# Patient Record
Sex: FEMALE | Race: WHITE
[De-identification: names, ages, dates, MRNs, and addresses within clinical notes are randomized per-mention and may not be internally consistent; named-entity substitution may affect disease eponyms.]

---
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
## 2019-04-02 ENCOUNTER — ALLIED HEALTH/NURSE VISIT (OUTPATIENT)
Dept: FAMILY MEDICINE | Facility: CLINIC | Age: 19
End: 2019-04-02
Payer: COMMERCIAL

## 2019-04-02 VITALS
DIASTOLIC BLOOD PRESSURE: 70 MMHG | WEIGHT: 205.3 LBS | HEART RATE: 80 BPM | HEIGHT: 68 IN | TEMPERATURE: 97.7 F | BODY MASS INDEX: 31.11 KG/M2 | SYSTOLIC BLOOD PRESSURE: 116 MMHG | OXYGEN SATURATION: 98 % | RESPIRATION RATE: 16 BRPM

## 2019-04-02 DIAGNOSIS — N91.2 ABSENCE OF MENSTRUATION: Primary | ICD-10-CM

## 2019-04-02 LAB — HCG UR QL: POSITIVE

## 2019-04-02 PROCEDURE — 81025 URINE PREGNANCY TEST: CPT | Performed by: FAMILY MEDICINE

## 2019-04-02 PROCEDURE — 99207 ZZC NO CHARGE NURSE ONLY: CPT

## 2019-04-02 RX ORDER — PRENATAL VIT/IRON FUM/FOLIC AC 27MG-0.8MG
1 TABLET ORAL DAILY
Qty: 120 TABLET | Refills: 3 | COMMUNITY
Start: 2019-04-02 | End: 2022-03-10

## 2019-04-02 ASSESSMENT — MIFFLIN-ST. JEOR: SCORE: 1754.73

## 2019-04-02 NOTE — NURSING NOTE
Tess Graf is a 18 year old here today for a pregnancy test.  LMP: Patient's last menstrual period was 2018 (exact date).  Wt: 205 lbs 4.8 oz.    Symptoms include weight gain, breast tenderness, absence of menses, nausea &/or vomiting and fatigue.    Tess informed of positive pregnancy test results. JERED: 18    Educational advice given: nutrition, smoking and drugs & alcohol.    Current medications reviewed: Yes    Previous pregnancy history remarkable for: This is her first pregnancy. Transferring care from [place] as just moved to [place]. .    Plan: schedule appointment with OB Educator and/or OB class, follow-up appointment with Dr. Giles for pre- care, take multivitamin or pre-lavell vitamins, OB Education packet given and will sign release of information to obtain records from [place]..    She is to call back if she has any questions or concerns.  She is advised to notify a provider immediately if she experiences any severe cramping or abdominal pain or any vaginal bleeding.  MARINE Medrano

## 2019-04-08 ENCOUNTER — PRENATAL OFFICE VISIT (OUTPATIENT)
Dept: FAMILY MEDICINE | Facility: CLINIC | Age: 19
End: 2019-04-08
Payer: COMMERCIAL

## 2019-04-08 VITALS — BODY MASS INDEX: 47.44 KG/M2 | WEIGHT: 205 LBS | HEIGHT: 55 IN

## 2019-04-08 DIAGNOSIS — Z34.00 SUPERVISION OF NORMAL FIRST PREGNANCY: Primary | ICD-10-CM

## 2019-04-08 PROCEDURE — 99207 ZZC NO CHARGE NURSE ONLY: CPT

## 2019-04-08 ASSESSMENT — MIFFLIN-ST. JEOR: SCORE: 1181.87

## 2019-04-10 ENCOUNTER — ANCILLARY PROCEDURE (OUTPATIENT)
Dept: ULTRASOUND IMAGING | Facility: OTHER | Age: 19
End: 2019-04-10
Attending: FAMILY MEDICINE
Payer: COMMERCIAL

## 2019-04-10 DIAGNOSIS — Z34.00 SUPERVISION OF NORMAL FIRST PREGNANCY: ICD-10-CM

## 2019-04-10 PROCEDURE — 76805 OB US >/= 14 WKS SNGL FETUS: CPT

## 2019-04-15 ENCOUNTER — PRENATAL OFFICE VISIT (OUTPATIENT)
Dept: FAMILY MEDICINE | Facility: CLINIC | Age: 19
End: 2019-04-15
Payer: COMMERCIAL

## 2019-04-15 VITALS
OXYGEN SATURATION: 98 % | HEART RATE: 125 BPM | SYSTOLIC BLOOD PRESSURE: 124 MMHG | RESPIRATION RATE: 12 BRPM | DIASTOLIC BLOOD PRESSURE: 60 MMHG | BODY MASS INDEX: 144.73 KG/M2 | TEMPERATURE: 98.4 F | WEIGHT: 210.8 LBS

## 2019-04-15 DIAGNOSIS — Z34.02 ENCOUNTER FOR SUPERVISION OF NORMAL FIRST PREGNANCY IN SECOND TRIMESTER: Primary | ICD-10-CM

## 2019-04-15 DIAGNOSIS — J45.990 EXERCISE-INDUCED ASTHMA: ICD-10-CM

## 2019-04-15 PROCEDURE — 99207 ZZC PRENATAL VISIT: CPT | Performed by: FAMILY MEDICINE

## 2019-04-15 RX ORDER — ALBUTEROL SULFATE 90 UG/1
2 AEROSOL, METERED RESPIRATORY (INHALATION) EVERY 4 HOURS PRN
COMMUNITY
Start: 2019-04-15

## 2019-04-15 ASSESSMENT — PAIN SCALES - GENERAL: PAINLEVEL: NO PAIN (0)

## 2019-04-15 NOTE — PROGRESS NOTES
Doing well. Transferring care here at 21 weeks, previously seen in [place]. All labs normal except O negative, Rubella not-immune.   Normal 1st pregnancy, excited. Here with SO Manoj. No real concerns, haven't felt baby move yet.   Discussed quickening.  Discussed Rh- status and RhoGam.  We will plan to do routine labs including glucose tolerance test at 29 weeks so we can do RhoGam same day.  Planning to breastfeed, having a girl.   I could hear plenty of movement during exam, but she didn't feel it.   Discussed schedule of visits, OB call system, peds care, encouraged prenatal classes.   Discussed activity, healthy diet and weight gain, travel, fluids/hydration.   Has history of exercise induced asthma, keeps inhaler at home but hasn't used it in a long time.   Will follow up in 4 weeks, or sooner prn.   Leonor Giles MD

## 2019-05-13 ENCOUNTER — PRENATAL OFFICE VISIT (OUTPATIENT)
Dept: FAMILY MEDICINE | Facility: CLINIC | Age: 19
End: 2019-05-13
Payer: COMMERCIAL

## 2019-05-13 VITALS
HEART RATE: 125 BPM | SYSTOLIC BLOOD PRESSURE: 110 MMHG | TEMPERATURE: 98.4 F | OXYGEN SATURATION: 96 % | BODY MASS INDEX: 145.28 KG/M2 | DIASTOLIC BLOOD PRESSURE: 72 MMHG | WEIGHT: 211.6 LBS | RESPIRATION RATE: 20 BRPM

## 2019-05-13 DIAGNOSIS — Z34.02 ENCOUNTER FOR SUPERVISION OF NORMAL FIRST PREGNANCY IN SECOND TRIMESTER: Primary | ICD-10-CM

## 2019-05-13 DIAGNOSIS — O26.899 RH NEGATIVE STATE IN ANTEPARTUM PERIOD: ICD-10-CM

## 2019-05-13 DIAGNOSIS — Z67.91 RH NEGATIVE STATE IN ANTEPARTUM PERIOD: ICD-10-CM

## 2019-05-13 PROCEDURE — 99207 ZZC PRENATAL VISIT: CPT | Performed by: FAMILY MEDICINE

## 2019-05-13 ASSESSMENT — PAIN SCALES - GENERAL: PAINLEVEL: NO PAIN (0)

## 2019-05-13 NOTE — PROGRESS NOTES
Doing well overall.  No bleeding, no regular ctx.   Last night had some discomfort across upper abdomen x 20 min or so, resolved.   Discussed normal discomforts of pregnancy and reportable signs and symptoms.   Discussed fetal kick counts.   Encouraged prenatal classes, they are planning on it.   Discussed doing labs at next visit in 4 weeks, along with Tdap and Rhogam, cervix check.   Discussed  labor.   Follow up sooner with any concerns.   Leonor Giles MD

## 2019-06-10 ENCOUNTER — PRENATAL OFFICE VISIT (OUTPATIENT)
Dept: FAMILY MEDICINE | Facility: CLINIC | Age: 19
End: 2019-06-10
Payer: COMMERCIAL

## 2019-06-10 VITALS
RESPIRATION RATE: 20 BRPM | HEIGHT: 68 IN | TEMPERATURE: 97.8 F | OXYGEN SATURATION: 99 % | SYSTOLIC BLOOD PRESSURE: 120 MMHG | DIASTOLIC BLOOD PRESSURE: 62 MMHG | BODY MASS INDEX: 32.55 KG/M2 | HEART RATE: 147 BPM | WEIGHT: 214.8 LBS

## 2019-06-10 DIAGNOSIS — Z34.03 ENCOUNTER FOR SUPERVISION OF NORMAL FIRST PREGNANCY IN THIRD TRIMESTER: Primary | ICD-10-CM

## 2019-06-10 DIAGNOSIS — Z67.91 RH NEGATIVE STATE IN ANTEPARTUM PERIOD: ICD-10-CM

## 2019-06-10 DIAGNOSIS — O26.899 RH NEGATIVE STATE IN ANTEPARTUM PERIOD: ICD-10-CM

## 2019-06-10 LAB
ABO + RH BLD: NORMAL
ABO + RH BLD: NORMAL
BLD GP AB SCN SERPL QL: NORMAL
BLOOD BANK CMNT PATIENT-IMP: NORMAL
GLUCOSE 1H P 50 G GLC PO SERPL-MCNC: 83 MG/DL (ref 60–129)
HGB BLD-MCNC: 12.9 G/DL (ref 11.7–15.7)
SPECIMEN EXP DATE BLD: NORMAL

## 2019-06-10 PROCEDURE — 36415 COLL VENOUS BLD VENIPUNCTURE: CPT | Performed by: FAMILY MEDICINE

## 2019-06-10 PROCEDURE — 82950 GLUCOSE TEST: CPT | Performed by: FAMILY MEDICINE

## 2019-06-10 PROCEDURE — 86850 RBC ANTIBODY SCREEN: CPT | Performed by: FAMILY MEDICINE

## 2019-06-10 PROCEDURE — 99207 ZZC PRENATAL VISIT: CPT | Performed by: FAMILY MEDICINE

## 2019-06-10 PROCEDURE — 86901 BLOOD TYPING SEROLOGIC RH(D): CPT | Performed by: FAMILY MEDICINE

## 2019-06-10 PROCEDURE — 90715 TDAP VACCINE 7 YRS/> IM: CPT | Performed by: FAMILY MEDICINE

## 2019-06-10 PROCEDURE — 86780 TREPONEMA PALLIDUM: CPT | Performed by: FAMILY MEDICINE

## 2019-06-10 PROCEDURE — 86900 BLOOD TYPING SEROLOGIC ABO: CPT | Performed by: FAMILY MEDICINE

## 2019-06-10 PROCEDURE — 90471 IMMUNIZATION ADMIN: CPT | Performed by: FAMILY MEDICINE

## 2019-06-10 PROCEDURE — 00000218 ZZHCL STATISTIC OBHBG - HEMOGLOBIN: Performed by: FAMILY MEDICINE

## 2019-06-10 ASSESSMENT — PAIN SCALES - GENERAL: PAINLEVEL: NO PAIN (0)

## 2019-06-10 ASSESSMENT — MIFFLIN-ST. JEOR: SCORE: 1792.83

## 2019-06-10 NOTE — PROGRESS NOTES
Doing well overall.  No bleeding, no pain.  GTT, Hgb, RPR, ABO pending.   Rhogam and Tdap given.   Discussed prenatal classes, encouraged her to schedule now if interested.   We briefly discussed pain med options, .   Discussed  labor.   Discussed warning signs for preeclampsia.  Will f/u in 3 week(s) or sooner with any concern for decreased fetal movement, vaginal bleeding, fluid leak, headache, vision change, severe nausea or vomiting, abdominal pain, increased edema or other concerns.   Leonor Giles MD

## 2019-06-11 LAB — T PALLIDUM AB SER QL: NONREACTIVE

## 2019-06-18 ENCOUNTER — TELEPHONE (OUTPATIENT)
Dept: FAMILY MEDICINE | Facility: CLINIC | Age: 19
End: 2019-06-18

## 2019-06-18 NOTE — TELEPHONE ENCOUNTER
Patient notified that her OB appointment on 7/8 at 10:30 needs to be changed to 12:15 pm due to Chan being in surgery that morning. Rosalie King LPN

## 2019-07-08 ENCOUNTER — PRENATAL OFFICE VISIT (OUTPATIENT)
Dept: FAMILY MEDICINE | Facility: CLINIC | Age: 19
End: 2019-07-08
Payer: COMMERCIAL

## 2019-07-08 VITALS
SYSTOLIC BLOOD PRESSURE: 126 MMHG | RESPIRATION RATE: 18 BRPM | BODY MASS INDEX: 33.27 KG/M2 | DIASTOLIC BLOOD PRESSURE: 62 MMHG | HEART RATE: 113 BPM | TEMPERATURE: 97.7 F | WEIGHT: 218.8 LBS | OXYGEN SATURATION: 98 %

## 2019-07-08 DIAGNOSIS — O09.93 SUPERVISION OF HIGH RISK PREGNANCY IN THIRD TRIMESTER: Primary | ICD-10-CM

## 2019-07-08 PROCEDURE — 99207 ZZC COMPLICATED OB VISIT: CPT | Performed by: FAMILY MEDICINE

## 2019-07-08 NOTE — PROGRESS NOTES
She has been having some stomach issues over the weekend, had pain across upper abdomen Saturday and today (felt flu-like).  Lasted a couple hours, then improved. Some nausea, no vomiting. Some diarrhea, no blood, no fever.   No vaginal bleeding, no regular ctx.   Discussed hydration, bland diet. Vitals normal today and weight gain still adequate. Will follow.   Discussed  labor.   Discussed warning signs for preeclampsia.  Will f/u in 2 week(s) or sooner with any concern for decreased fetal movement, vaginal bleeding, fluid leak, headache, vision change, severe nausea or vomiting, abdominal pain, increased edema or other concerns.   Leonor Giles MD

## 2019-07-17 ENCOUNTER — OFFICE VISIT (OUTPATIENT)
Dept: URGENT CARE | Facility: RETAIL CLINIC | Age: 19
End: 2019-07-17
Payer: COMMERCIAL

## 2019-07-17 VITALS
TEMPERATURE: 98.4 F | SYSTOLIC BLOOD PRESSURE: 115 MMHG | DIASTOLIC BLOOD PRESSURE: 71 MMHG | HEART RATE: 96 BPM | OXYGEN SATURATION: 99 %

## 2019-07-17 DIAGNOSIS — J45.41 MODERATE PERSISTENT ASTHMA WITH EXACERBATION: Primary | ICD-10-CM

## 2019-07-17 PROCEDURE — 99213 OFFICE O/P EST LOW 20 MIN: CPT | Performed by: NURSE PRACTITIONER

## 2019-07-17 RX ORDER — PREDNISONE 20 MG/1
20 TABLET ORAL 2 TIMES DAILY
Qty: 10 TABLET | Refills: 0 | Status: SHIPPED | OUTPATIENT
Start: 2019-07-17 | End: 2019-07-29

## 2019-07-17 ASSESSMENT — ENCOUNTER SYMPTOMS
MYALGIAS: 0
RHINORRHEA: 0
FEVER: 0
SORE THROAT: 1
WEAKNESS: 0
ACTIVITY CHANGE: 1
HEADACHES: 0
ADENOPATHY: 0
SHORTNESS OF BREATH: 1
SINUS PRESSURE: 0
COUGH: 1
DIAPHORESIS: 0
CHEST TIGHTNESS: 1
LIGHT-HEADEDNESS: 0
FATIGUE: 1
CHILLS: 0
DIZZINESS: 0
TROUBLE SWALLOWING: 0
STRIDOR: 0
SLEEP DISTURBANCE: 1
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
## 2019-07-17 NOTE — PATIENT INSTRUCTIONS
Likely viral upper respiratory infection with acute asthma exacerbation.  Oral prednisone warranted, discussed risks with pregnancy of both uncontrolled asthma and steroids.  Prednisone 20mg twice daily for 5 days  Continue daily maintenance inhaler and albuterol every 4-6 hours as needed for tightness.  Followup with OB.  Rest! Your body needs more rest to heal.  Drink plenty of fluids (warm fluids like tea or soup are soothing and reduce cough).  Sit in the bathroom with a hot shower running and breathe in the steam.  Honey may soothe your sore throat and help manage your cough- may take straight or in warm water with lemon juice.  Take Tylenol or an NSAID such as ibuprofen or naproxen as needed for pain..   Mucinex or Robitussin (guiafenesin) thin mucus and may help it to loosen more quickly  Good handwashing is the best way to prevent spread of germs  Present to emergency room if you develop trouble breathing, swallowing or cough-up blood, feel dizzy, fevers, sweats, chills.

## 2019-07-22 ENCOUNTER — PRENATAL OFFICE VISIT (OUTPATIENT)
Dept: FAMILY MEDICINE | Facility: CLINIC | Age: 19
End: 2019-07-22
Payer: COMMERCIAL

## 2019-07-22 VITALS
SYSTOLIC BLOOD PRESSURE: 110 MMHG | WEIGHT: 219.8 LBS | TEMPERATURE: 97.3 F | HEART RATE: 146 BPM | RESPIRATION RATE: 20 BRPM | BODY MASS INDEX: 33.31 KG/M2 | HEIGHT: 68 IN | DIASTOLIC BLOOD PRESSURE: 60 MMHG | OXYGEN SATURATION: 100 %

## 2019-07-22 DIAGNOSIS — O26.899 RH NEGATIVE STATE IN ANTEPARTUM PERIOD: ICD-10-CM

## 2019-07-22 DIAGNOSIS — J45.990 EXERCISE-INDUCED ASTHMA: ICD-10-CM

## 2019-07-22 DIAGNOSIS — O09.93 SUPERVISION OF HIGH RISK PREGNANCY IN THIRD TRIMESTER: Primary | ICD-10-CM

## 2019-07-22 DIAGNOSIS — Z67.91 RH NEGATIVE STATE IN ANTEPARTUM PERIOD: ICD-10-CM

## 2019-07-22 PROCEDURE — 99207 ZZC COMPLICATED OB VISIT: CPT | Performed by: FAMILY MEDICINE

## 2019-07-22 ASSESSMENT — PAIN SCALES - GENERAL: PAINLEVEL: NO PAIN (0)

## 2019-07-22 ASSESSMENT — MIFFLIN-ST. JEOR: SCORE: 1815.51

## 2019-07-24 PROBLEM — Z34.02 ENCOUNTER FOR SUPERVISION OF NORMAL FIRST PREGNANCY IN SECOND TRIMESTER: Status: RESOLVED | Noted: 2019-04-15 | Resolved: 2019-07-24

## 2019-07-24 PROBLEM — O09.93 SUPERVISION OF HIGH RISK PREGNANCY IN THIRD TRIMESTER: Status: ACTIVE | Noted: 2019-07-24

## 2019-07-24 NOTE — PROGRESS NOTES
Doing well overall.  No bleeding, no regular ctx.  Last  after mowing the lawn she had an allergy/asthma attack, became SOB and had some chest pain.  Was seen in Urgent Care and started on Prednisone, today is last day of medication. Doing better.   She is waking at night with night sweats, but otherwise tolerating it ok.   Ox sats are %, exam normal today. No wheezing, lungs clear.  Will monitor for recurrence. Discussed prednisone likely safe for baby at this stage of pregnancy.   Discussed  labor.  Discussed warning signs for preeclampsia.  Will f/u in 1 week(s) or sooner with any concern for decreased fetal movement, vaginal bleeding, fluid leak, headache, vision change, severe nausea or vomiting, abdominal pain, increased edema or other concerns.   Leonor Giles MD

## 2019-07-25 ENCOUNTER — TELEPHONE (OUTPATIENT)
Dept: FAMILY MEDICINE | Facility: CLINIC | Age: 19
End: 2019-07-25

## 2019-07-25 NOTE — TELEPHONE ENCOUNTER
Provider received a request of signature for a Pelvic Control Band for preventive care.  Faxed to [facility] at [phone]. Will have scanned into chart for future reference if needed.   Grecia Silverman CMA (AAMA)

## 2019-07-29 ENCOUNTER — PRENATAL OFFICE VISIT (OUTPATIENT)
Dept: FAMILY MEDICINE | Facility: CLINIC | Age: 19
End: 2019-07-29
Payer: COMMERCIAL

## 2019-07-29 VITALS
BODY MASS INDEX: 33.75 KG/M2 | RESPIRATION RATE: 16 BRPM | SYSTOLIC BLOOD PRESSURE: 120 MMHG | DIASTOLIC BLOOD PRESSURE: 66 MMHG | TEMPERATURE: 97 F | OXYGEN SATURATION: 96 % | HEART RATE: 84 BPM | WEIGHT: 222 LBS

## 2019-07-29 DIAGNOSIS — Z67.91 RH NEGATIVE STATE IN ANTEPARTUM PERIOD: ICD-10-CM

## 2019-07-29 DIAGNOSIS — J45.990 EXERCISE-INDUCED ASTHMA: ICD-10-CM

## 2019-07-29 DIAGNOSIS — O26.899 RH NEGATIVE STATE IN ANTEPARTUM PERIOD: ICD-10-CM

## 2019-07-29 DIAGNOSIS — O09.93 SUPERVISION OF HIGH RISK PREGNANCY IN THIRD TRIMESTER: Primary | ICD-10-CM

## 2019-07-29 PROCEDURE — 87186 SC STD MICRODIL/AGAR DIL: CPT | Performed by: FAMILY MEDICINE

## 2019-07-29 PROCEDURE — 99207 ZZC COMPLICATED OB VISIT: CPT | Performed by: FAMILY MEDICINE

## 2019-07-29 PROCEDURE — 87653 STREP B DNA AMP PROBE: CPT | Performed by: FAMILY MEDICINE

## 2019-07-29 ASSESSMENT — PAIN SCALES - GENERAL: PAINLEVEL: NO PAIN (0)

## 2019-07-30 LAB
GP B STREP DNA SPEC QL NAA+PROBE: POSITIVE
SPECIMEN SOURCE: ABNORMAL

## 2019-08-02 NOTE — PROGRESS NOTES
Breathing much better.  No concerns today.   No bleeding, LOF, or regular contractions.   GBS sent.    Discussed when to present for signs and symptoms of labor.   Discussed warning signs for preeclampsia.  Will f/u in 1 week(s) or sooner with any concern for decreased fetal movement, vaginal bleeding, fluid leak, headache, vision change, severe nausea or vomiting, abdominal pain, increased edema or other concerns.   Leonor Giles MD

## 2019-08-03 LAB
BACTERIA SPEC CULT: ABNORMAL
SPECIMEN SOURCE: ABNORMAL

## 2019-08-03 NOTE — RESULT ENCOUNTER NOTE
Notify Tess that her strep test is positive. We'll review at her next upcoming visit.   Leonor Giles MD

## 2019-08-05 ENCOUNTER — PRENATAL OFFICE VISIT (OUTPATIENT)
Dept: FAMILY MEDICINE | Facility: CLINIC | Age: 19
End: 2019-08-05
Payer: COMMERCIAL

## 2019-08-05 VITALS
WEIGHT: 226 LBS | RESPIRATION RATE: 24 BRPM | SYSTOLIC BLOOD PRESSURE: 110 MMHG | HEART RATE: 129 BPM | TEMPERATURE: 97.5 F | OXYGEN SATURATION: 99 % | BODY MASS INDEX: 34.36 KG/M2 | DIASTOLIC BLOOD PRESSURE: 70 MMHG

## 2019-08-05 DIAGNOSIS — O99.820 GBS (GROUP B STREPTOCOCCUS CARRIER), +RV CULTURE, CURRENTLY PREGNANT: ICD-10-CM

## 2019-08-05 DIAGNOSIS — O09.93 SUPERVISION OF HIGH RISK PREGNANCY IN THIRD TRIMESTER: Primary | ICD-10-CM

## 2019-08-05 DIAGNOSIS — Z67.91 BLOOD TYPE, RH NEGATIVE: ICD-10-CM

## 2019-08-05 PROCEDURE — 99207 ZZC COMPLICATED OB VISIT: CPT | Performed by: FAMILY MEDICINE

## 2019-08-05 ASSESSMENT — PAIN SCALES - GENERAL: PAINLEVEL: NO PAIN (0)

## 2019-08-05 NOTE — PROGRESS NOTES
Had some nausea and vomiting last night but had eaten at a [employer] restaurant for the first time, thinks something didn't agree with her.  Better now.   No headaches, vision change, abdominal pain. No bleeding or LOF.   No regular contractions, baby active.   GBS positive.   Discussed when to present for signs and symptoms of labor.   Discussed warning signs for preeclampsia.  Will f/u in 1 week(s) or sooner with any concern for decreased fetal movement, vaginal bleeding, fluid leak, headache, vision change, severe nausea or vomiting, abdominal pain, increased edema or other concerns.   Leonor Giles MD

## 2019-08-12 ENCOUNTER — PRENATAL OFFICE VISIT (OUTPATIENT)
Dept: FAMILY MEDICINE | Facility: CLINIC | Age: 19
End: 2019-08-12
Payer: COMMERCIAL

## 2019-08-12 VITALS
SYSTOLIC BLOOD PRESSURE: 110 MMHG | OXYGEN SATURATION: 99 % | HEART RATE: 114 BPM | DIASTOLIC BLOOD PRESSURE: 74 MMHG | BODY MASS INDEX: 34.36 KG/M2 | RESPIRATION RATE: 16 BRPM | WEIGHT: 226 LBS | TEMPERATURE: 98.1 F

## 2019-08-12 DIAGNOSIS — O09.93 SUPERVISION OF HIGH RISK PREGNANCY IN THIRD TRIMESTER: Primary | ICD-10-CM

## 2019-08-12 DIAGNOSIS — Z67.91 BLOOD TYPE, RH NEGATIVE: ICD-10-CM

## 2019-08-12 PROBLEM — O09.899 RUBELLA NON-IMMUNE STATUS, ANTEPARTUM: Status: ACTIVE | Noted: 2019-02-19

## 2019-08-12 PROBLEM — Z28.39 RUBELLA NON-IMMUNE STATUS, ANTEPARTUM: Status: ACTIVE | Noted: 2019-02-19

## 2019-08-12 PROBLEM — J45.909 REACTIVE AIRWAY DISEASE: Status: ACTIVE | Noted: 2019-08-12

## 2019-08-12 PROCEDURE — 99207 ZZC PRENATAL VISIT: CPT | Performed by: FAMILY MEDICINE

## 2019-08-12 ASSESSMENT — PAIN SCALES - GENERAL: PAINLEVEL: NO PAIN (0)

## 2019-08-12 NOTE — PROGRESS NOTES
Concerns: none.  Chart reviewed -no complication with the pregnancy.    Normal fetal movement with rare, irregular, non-painful contractions.  No vaginal bleeding, LOF.  No RUQ pain, N/V, visual changes.  Had headache last night but resolved quickly with the Tylenol  No cramping, abnormal discharge or vaginal bleeding  No leg swelling    Cervix not check as per patient's request  Continue with prenatal vitamin and encouraged to drink a lot of water  Intrapartum care and pain management discussed - plan for natural but will consider epidural    Discussed about laboring down and indication for episiotomy, vacuum-assisted delivery and   Discussed kick counts and fetal movement and reportable signs and symptoms.  Labor precautions discussed; discussed about signs of labor and when to call or come in.  Prenatal flowsheet information is reviewed.  RTC 1 week with Dr. Giles as scheduled.  All of her questions were answered.    Adalgisa Bynum Mai, MD

## 2019-08-14 ASSESSMENT — ASTHMA QUESTIONNAIRES: ACT_TOTALSCORE: 20

## 2019-08-19 ENCOUNTER — PRENATAL OFFICE VISIT (OUTPATIENT)
Dept: FAMILY MEDICINE | Facility: CLINIC | Age: 19
End: 2019-08-19
Payer: COMMERCIAL

## 2019-08-19 VITALS
BODY MASS INDEX: 34.52 KG/M2 | SYSTOLIC BLOOD PRESSURE: 130 MMHG | TEMPERATURE: 98.6 F | OXYGEN SATURATION: 98 % | WEIGHT: 227 LBS | RESPIRATION RATE: 16 BRPM | DIASTOLIC BLOOD PRESSURE: 74 MMHG | HEART RATE: 118 BPM

## 2019-08-19 DIAGNOSIS — O99.820 GBS (GROUP B STREPTOCOCCUS CARRIER), +RV CULTURE, CURRENTLY PREGNANT: ICD-10-CM

## 2019-08-19 DIAGNOSIS — O09.93 SUPERVISION OF HIGH RISK PREGNANCY IN THIRD TRIMESTER: Primary | ICD-10-CM

## 2019-08-19 PROCEDURE — 99207 ZZC COMPLICATED OB VISIT: CPT | Performed by: FAMILY MEDICINE

## 2019-08-19 ASSESSMENT — PAIN SCALES - GENERAL: PAINLEVEL: NO PAIN (0)

## 2019-08-19 NOTE — PROGRESS NOTES
Doing well overall.  No bleeding, no regular ctx. Baby active. No ha, vision change, nv.   Discussed GBS treatment in labor.   Membranes stripped today at her request.   Discussed when to present for signs and symptoms of labor.   Discussed warning signs for preeclampsia.  Will f/u in 1 week(s) or sooner with any concern for decreased fetal movement, vaginal bleeding, fluid leak, headache, vision change, severe nausea or vomiting, abdominal pain, increased edema or other concerns.   Leonor Giles MD

## 2019-08-26 ENCOUNTER — PRENATAL OFFICE VISIT (OUTPATIENT)
Dept: FAMILY MEDICINE | Facility: CLINIC | Age: 19
End: 2019-08-26
Payer: COMMERCIAL

## 2019-08-26 VITALS
WEIGHT: 229 LBS | SYSTOLIC BLOOD PRESSURE: 122 MMHG | HEART RATE: 78 BPM | RESPIRATION RATE: 16 BRPM | TEMPERATURE: 97.9 F | BODY MASS INDEX: 34.82 KG/M2 | OXYGEN SATURATION: 99 % | DIASTOLIC BLOOD PRESSURE: 60 MMHG

## 2019-08-26 DIAGNOSIS — O99.820 GBS (GROUP B STREPTOCOCCUS CARRIER), +RV CULTURE, CURRENTLY PREGNANT: ICD-10-CM

## 2019-08-26 DIAGNOSIS — Z28.39 RUBELLA NON-IMMUNE STATUS, ANTEPARTUM: ICD-10-CM

## 2019-08-26 DIAGNOSIS — O09.93 SUPERVISION OF HIGH RISK PREGNANCY IN THIRD TRIMESTER: Primary | ICD-10-CM

## 2019-08-26 DIAGNOSIS — O09.899 RUBELLA NON-IMMUNE STATUS, ANTEPARTUM: ICD-10-CM

## 2019-08-26 DIAGNOSIS — Z67.91 BLOOD TYPE, RH NEGATIVE: ICD-10-CM

## 2019-08-26 PROCEDURE — 99207 ZZC PRENATAL VISIT: CPT | Performed by: FAMILY MEDICINE

## 2019-08-26 RX ORDER — OXYTOCIN 10 [USP'U]/ML
10 INJECTION, SOLUTION INTRAMUSCULAR; INTRAVENOUS
Status: CANCELLED | OUTPATIENT
Start: 2019-08-26

## 2019-08-26 RX ORDER — OXYTOCIN/0.9 % SODIUM CHLORIDE 30/500 ML
1-24 PLASTIC BAG, INJECTION (ML) INTRAVENOUS CONTINUOUS
Status: CANCELLED | OUTPATIENT
Start: 2019-08-26

## 2019-08-26 RX ORDER — ACETAMINOPHEN 325 MG/1
650 TABLET ORAL EVERY 4 HOURS PRN
Status: CANCELLED | OUTPATIENT
Start: 2019-08-26

## 2019-08-26 RX ORDER — METHYLERGONOVINE MALEATE 0.2 MG/ML
200 INJECTION INTRAVENOUS
Status: CANCELLED | OUTPATIENT
Start: 2019-08-26

## 2019-08-26 RX ORDER — NALOXONE HYDROCHLORIDE 0.4 MG/ML
.1-.4 INJECTION, SOLUTION INTRAMUSCULAR; INTRAVENOUS; SUBCUTANEOUS
Status: CANCELLED | OUTPATIENT
Start: 2019-08-26

## 2019-08-26 RX ORDER — LIDOCAINE 40 MG/G
CREAM TOPICAL
Status: CANCELLED | OUTPATIENT
Start: 2019-08-26

## 2019-08-26 RX ORDER — PENICILLIN G POTASSIUM 5000000 [IU]/1
5 INJECTION, POWDER, FOR SOLUTION INTRAMUSCULAR; INTRAVENOUS ONCE
Status: CANCELLED | OUTPATIENT
Start: 2019-08-26 | End: 2019-08-26

## 2019-08-26 RX ORDER — OXYCODONE AND ACETAMINOPHEN 5; 325 MG/1; MG/1
1 TABLET ORAL
Status: CANCELLED | OUTPATIENT
Start: 2019-08-26

## 2019-08-26 RX ORDER — OXYTOCIN/0.9 % SODIUM CHLORIDE 30/500 ML
100-340 PLASTIC BAG, INJECTION (ML) INTRAVENOUS CONTINUOUS PRN
Status: CANCELLED | OUTPATIENT
Start: 2019-08-26

## 2019-08-26 RX ORDER — FENTANYL CITRATE 50 UG/ML
50-100 INJECTION, SOLUTION INTRAMUSCULAR; INTRAVENOUS
Status: CANCELLED | OUTPATIENT
Start: 2019-08-26

## 2019-08-26 RX ORDER — IBUPROFEN 400 MG/1
800 TABLET, FILM COATED ORAL
Status: CANCELLED | OUTPATIENT
Start: 2019-08-26

## 2019-08-26 RX ORDER — ONDANSETRON 2 MG/ML
4 INJECTION INTRAMUSCULAR; INTRAVENOUS EVERY 6 HOURS PRN
Status: CANCELLED | OUTPATIENT
Start: 2019-08-26

## 2019-08-26 RX ORDER — CARBOPROST TROMETHAMINE 250 UG/ML
250 INJECTION, SOLUTION INTRAMUSCULAR
Status: CANCELLED | OUTPATIENT
Start: 2019-08-26

## 2019-08-26 ASSESSMENT — PAIN SCALES - GENERAL: PAINLEVEL: NO PAIN (0)

## 2019-08-29 ENCOUNTER — HOSPITAL ENCOUNTER (INPATIENT)
Facility: CLINIC | Age: 19
LOS: 2 days | Discharge: HOME | End: 2019-08-31
Attending: FAMILY MEDICINE | Admitting: FAMILY MEDICINE
Payer: COMMERCIAL

## 2019-08-29 ENCOUNTER — ANESTHESIA EVENT (OUTPATIENT)
Dept: OBGYN | Facility: CLINIC | Age: 19
End: 2019-08-29
Payer: COMMERCIAL

## 2019-08-29 ENCOUNTER — ANESTHESIA (OUTPATIENT)
Dept: OBGYN | Facility: CLINIC | Age: 19
End: 2019-08-29
Payer: COMMERCIAL

## 2019-08-29 PROBLEM — Z34.03 SUPERVISION OF NORMAL FIRST PREGNANCY IN THIRD TRIMESTER: Status: ACTIVE | Noted: 2019-08-29

## 2019-08-29 LAB
ABO + RH BLD: NORMAL
ABO + RH BLD: NORMAL
BLOOD BANK CMNT PATIENT-IMP: NORMAL
BLOOD BANK CMNT PATIENT-IMP: NORMAL
PLATELET # BLD AUTO: 248 10E9/L (ref 150–450)
SPECIMEN EXP DATE BLD: NORMAL

## 2019-08-29 PROCEDURE — 37000011 ZZH ANESTHESIA WARD SERVICE: Performed by: NURSE ANESTHETIST, CERTIFIED REGISTERED

## 2019-08-29 PROCEDURE — 25000125 ZZHC RX 250

## 2019-08-29 PROCEDURE — 12000000 ZZH R&B MED SURG/OB

## 2019-08-29 PROCEDURE — 3E0R3BZ INTRODUCTION OF ANESTHETIC AGENT INTO SPINAL CANAL, PERCUTANEOUS APPROACH: ICD-10-PCS | Performed by: FAMILY MEDICINE

## 2019-08-29 PROCEDURE — 72200001 ZZH LABOR CARE VAGINAL DELIVERY SINGLE

## 2019-08-29 PROCEDURE — 36415 COLL VENOUS BLD VENIPUNCTURE: CPT | Performed by: FAMILY MEDICINE

## 2019-08-29 PROCEDURE — 25800030 ZZH RX IP 258 OP 636: Performed by: FAMILY MEDICINE

## 2019-08-29 PROCEDURE — 86900 BLOOD TYPING SEROLOGIC ABO: CPT | Performed by: FAMILY MEDICINE

## 2019-08-29 PROCEDURE — 0KQM0ZZ REPAIR PERINEUM MUSCLE, OPEN APPROACH: ICD-10-PCS | Performed by: FAMILY MEDICINE

## 2019-08-29 PROCEDURE — 59400 OBSTETRICAL CARE: CPT | Performed by: FAMILY MEDICINE

## 2019-08-29 PROCEDURE — 86901 BLOOD TYPING SEROLOGIC RH(D): CPT | Performed by: FAMILY MEDICINE

## 2019-08-29 PROCEDURE — 40000671 ZZH STATISTIC ANESTHESIA CASE

## 2019-08-29 PROCEDURE — 00HU33Z INSERTION OF INFUSION DEVICE INTO SPINAL CANAL, PERCUTANEOUS APPROACH: ICD-10-PCS | Performed by: FAMILY MEDICINE

## 2019-08-29 PROCEDURE — 86780 TREPONEMA PALLIDUM: CPT | Performed by: FAMILY MEDICINE

## 2019-08-29 PROCEDURE — 25000125 ZZHC RX 250: Performed by: NURSE ANESTHETIST, CERTIFIED REGISTERED

## 2019-08-29 PROCEDURE — 25000128 H RX IP 250 OP 636

## 2019-08-29 PROCEDURE — 85049 AUTOMATED PLATELET COUNT: CPT | Performed by: FAMILY MEDICINE

## 2019-08-29 PROCEDURE — 27110038 ZZH RX 271

## 2019-08-29 PROCEDURE — 25000132 ZZH RX MED GY IP 250 OP 250 PS 637: Performed by: FAMILY MEDICINE

## 2019-08-29 PROCEDURE — 25000128 H RX IP 250 OP 636: Performed by: FAMILY MEDICINE

## 2019-08-29 PROCEDURE — 25800030 ZZH RX IP 258 OP 636

## 2019-08-29 PROCEDURE — 25000128 H RX IP 250 OP 636: Performed by: NURSE ANESTHETIST, CERTIFIED REGISTERED

## 2019-08-29 RX ORDER — OXYTOCIN/0.9 % SODIUM CHLORIDE 30/500 ML
100 PLASTIC BAG, INJECTION (ML) INTRAVENOUS CONTINUOUS
Status: DISCONTINUED | OUTPATIENT
Start: 2019-08-29 | End: 2019-08-31 | Stop reason: HOSPADM

## 2019-08-29 RX ORDER — ALBUTEROL SULFATE 90 UG/1
2 AEROSOL, METERED RESPIRATORY (INHALATION) EVERY 4 HOURS PRN
Status: DISCONTINUED | OUTPATIENT
Start: 2019-08-29 | End: 2019-08-31 | Stop reason: HOSPADM

## 2019-08-29 RX ORDER — LIDOCAINE HYDROCHLORIDE 10 MG/ML
1 INJECTION, SOLUTION EPIDURAL; INFILTRATION; INTRACAUDAL; PERINEURAL ONCE
Status: DISCONTINUED | OUTPATIENT
Start: 2019-08-29 | End: 2019-08-29

## 2019-08-29 RX ORDER — ONDANSETRON 2 MG/ML
4 INJECTION INTRAMUSCULAR; INTRAVENOUS EVERY 6 HOURS PRN
Status: DISCONTINUED | OUTPATIENT
Start: 2019-08-29 | End: 2019-08-29

## 2019-08-29 RX ORDER — BISACODYL 10 MG
10 SUPPOSITORY, RECTAL RECTAL DAILY PRN
Status: DISCONTINUED | OUTPATIENT
Start: 2019-08-31 | End: 2019-08-31 | Stop reason: HOSPADM

## 2019-08-29 RX ORDER — ACETAMINOPHEN 325 MG/1
650 TABLET ORAL EVERY 4 HOURS PRN
Status: DISCONTINUED | OUTPATIENT
Start: 2019-08-29 | End: 2019-08-29

## 2019-08-29 RX ORDER — LANOLIN 100 %
OINTMENT (GRAM) TOPICAL
Status: DISCONTINUED | OUTPATIENT
Start: 2019-08-29 | End: 2019-08-31 | Stop reason: HOSPADM

## 2019-08-29 RX ORDER — AMOXICILLIN 250 MG
2 CAPSULE ORAL 2 TIMES DAILY
Status: DISCONTINUED | OUTPATIENT
Start: 2019-08-29 | End: 2019-08-31 | Stop reason: HOSPADM

## 2019-08-29 RX ORDER — LIDOCAINE HYDROCHLORIDE AND EPINEPHRINE 15; 5 MG/ML; UG/ML
INJECTION, SOLUTION EPIDURAL PRN
Status: DISCONTINUED | OUTPATIENT
Start: 2019-08-29 | End: 2019-08-29

## 2019-08-29 RX ORDER — AMOXICILLIN 250 MG
1 CAPSULE ORAL 2 TIMES DAILY
Status: DISCONTINUED | OUTPATIENT
Start: 2019-08-29 | End: 2019-08-31 | Stop reason: HOSPADM

## 2019-08-29 RX ORDER — LIDOCAINE HYDROCHLORIDE 10 MG/ML
INJECTION, SOLUTION EPIDURAL; INFILTRATION; INTRACAUDAL; PERINEURAL
Status: DISCONTINUED
Start: 2019-08-29 | End: 2019-08-29 | Stop reason: HOSPADM

## 2019-08-29 RX ORDER — NALBUPHINE HYDROCHLORIDE 10 MG/ML
2.5-5 INJECTION, SOLUTION INTRAMUSCULAR; INTRAVENOUS; SUBCUTANEOUS EVERY 6 HOURS PRN
Status: DISCONTINUED | OUTPATIENT
Start: 2019-08-29 | End: 2019-08-29

## 2019-08-29 RX ORDER — EPHEDRINE SULFATE 50 MG/ML
5 INJECTION, SOLUTION INTRAMUSCULAR; INTRAVENOUS; SUBCUTANEOUS
Status: DISCONTINUED | OUTPATIENT
Start: 2019-08-29 | End: 2019-08-29

## 2019-08-29 RX ORDER — LIDOCAINE HYDROCHLORIDE 10 MG/ML
INJECTION, SOLUTION EPIDURAL; INFILTRATION; INTRACAUDAL; PERINEURAL
Status: COMPLETED
Start: 2019-08-29 | End: 2019-08-29

## 2019-08-29 RX ORDER — FENTANYL/BUPIVACAINE/NS/PF 2-1250MCG
PLASTIC BAG, INJECTION (ML) INJECTION
Status: COMPLETED
Start: 2019-08-29 | End: 2019-08-29

## 2019-08-29 RX ORDER — OXYCODONE HYDROCHLORIDE 5 MG/1
5 TABLET ORAL EVERY 4 HOURS PRN
Status: DISCONTINUED | OUTPATIENT
Start: 2019-08-29 | End: 2019-08-31 | Stop reason: HOSPADM

## 2019-08-29 RX ORDER — LIDOCAINE 40 MG/G
CREAM TOPICAL
Status: DISCONTINUED | OUTPATIENT
Start: 2019-08-29 | End: 2019-08-29

## 2019-08-29 RX ORDER — IBUPROFEN 800 MG/1
800 TABLET, FILM COATED ORAL
Status: DISCONTINUED | OUTPATIENT
Start: 2019-08-29 | End: 2019-08-29

## 2019-08-29 RX ORDER — NALOXONE HYDROCHLORIDE 0.4 MG/ML
.1-.4 INJECTION, SOLUTION INTRAMUSCULAR; INTRAVENOUS; SUBCUTANEOUS
Status: DISCONTINUED | OUTPATIENT
Start: 2019-08-29 | End: 2019-08-29

## 2019-08-29 RX ORDER — OXYTOCIN/0.9 % SODIUM CHLORIDE 30/500 ML
100-340 PLASTIC BAG, INJECTION (ML) INTRAVENOUS CONTINUOUS PRN
Status: DISCONTINUED | OUTPATIENT
Start: 2019-08-29 | End: 2019-08-29

## 2019-08-29 RX ORDER — PENICILLIN G POTASSIUM 5000000 [IU]/1
5 INJECTION, POWDER, FOR SOLUTION INTRAMUSCULAR; INTRAVENOUS ONCE
Status: COMPLETED | OUTPATIENT
Start: 2019-08-29 | End: 2019-08-29

## 2019-08-29 RX ORDER — BUPIVACAINE HYDROCHLORIDE 2.5 MG/ML
INJECTION, SOLUTION INFILTRATION; PERINEURAL PRN
Status: DISCONTINUED | OUTPATIENT
Start: 2019-08-29 | End: 2019-08-29

## 2019-08-29 RX ORDER — FENTANYL CITRATE 50 UG/ML
50-100 INJECTION, SOLUTION INTRAMUSCULAR; INTRAVENOUS
Status: DISCONTINUED | OUTPATIENT
Start: 2019-08-29 | End: 2019-08-29

## 2019-08-29 RX ORDER — OXYTOCIN 10 [USP'U]/ML
10 INJECTION, SOLUTION INTRAMUSCULAR; INTRAVENOUS
Status: DISCONTINUED | OUTPATIENT
Start: 2019-08-29 | End: 2019-08-31 | Stop reason: HOSPADM

## 2019-08-29 RX ORDER — NALOXONE HYDROCHLORIDE 0.4 MG/ML
.1-.4 INJECTION, SOLUTION INTRAMUSCULAR; INTRAVENOUS; SUBCUTANEOUS
Status: DISCONTINUED | OUTPATIENT
Start: 2019-08-29 | End: 2019-08-31 | Stop reason: HOSPADM

## 2019-08-29 RX ORDER — METHYLERGONOVINE MALEATE 0.2 MG/ML
200 INJECTION INTRAVENOUS
Status: DISCONTINUED | OUTPATIENT
Start: 2019-08-29 | End: 2019-08-29

## 2019-08-29 RX ORDER — OXYTOCIN/0.9 % SODIUM CHLORIDE 30/500 ML
340 PLASTIC BAG, INJECTION (ML) INTRAVENOUS CONTINUOUS PRN
Status: DISCONTINUED | OUTPATIENT
Start: 2019-08-29 | End: 2019-08-31 | Stop reason: HOSPADM

## 2019-08-29 RX ORDER — OXYTOCIN 10 [USP'U]/ML
10 INJECTION, SOLUTION INTRAMUSCULAR; INTRAVENOUS
Status: COMPLETED | OUTPATIENT
Start: 2019-08-29 | End: 2019-08-29

## 2019-08-29 RX ORDER — PRENATAL VIT/IRON FUM/FOLIC AC 27MG-0.8MG
1 TABLET ORAL DAILY
Status: DISCONTINUED | OUTPATIENT
Start: 2019-08-29 | End: 2019-08-31 | Stop reason: HOSPADM

## 2019-08-29 RX ORDER — OXYTOCIN/0.9 % SODIUM CHLORIDE 30/500 ML
1-24 PLASTIC BAG, INJECTION (ML) INTRAVENOUS CONTINUOUS
Status: DISCONTINUED | OUTPATIENT
Start: 2019-08-29 | End: 2019-08-29

## 2019-08-29 RX ORDER — ACETAMINOPHEN 325 MG/1
650 TABLET ORAL EVERY 4 HOURS PRN
Status: DISCONTINUED | OUTPATIENT
Start: 2019-08-29 | End: 2019-08-31 | Stop reason: HOSPADM

## 2019-08-29 RX ORDER — OXYCODONE AND ACETAMINOPHEN 5; 325 MG/1; MG/1
1 TABLET ORAL
Status: DISCONTINUED | OUTPATIENT
Start: 2019-08-29 | End: 2019-08-29

## 2019-08-29 RX ORDER — IBUPROFEN 800 MG/1
800 TABLET, FILM COATED ORAL EVERY 6 HOURS PRN
Status: DISCONTINUED | OUTPATIENT
Start: 2019-08-29 | End: 2019-08-31 | Stop reason: HOSPADM

## 2019-08-29 RX ORDER — CARBOPROST TROMETHAMINE 250 UG/ML
250 INJECTION, SOLUTION INTRAMUSCULAR
Status: DISCONTINUED | OUTPATIENT
Start: 2019-08-29 | End: 2019-08-29

## 2019-08-29 RX ADMIN — BUPIVACAINE HYDROCHLORIDE 10 ML: 2.5 INJECTION, SOLUTION INFILTRATION; PERINEURAL at 13:43

## 2019-08-29 RX ADMIN — OXYTOCIN 10 UNITS: 10 INJECTION, SOLUTION INTRAMUSCULAR; INTRAVENOUS at 16:55

## 2019-08-29 RX ADMIN — LIDOCAINE HYDROCHLORIDE,EPINEPHRINE BITARTRATE 3 ML: 15; .005 INJECTION, SOLUTION EPIDURAL; INFILTRATION; INTRACAUDAL; PERINEURAL at 13:36

## 2019-08-29 RX ADMIN — IBUPROFEN 800 MG: 800 TABLET ORAL at 22:16

## 2019-08-29 RX ADMIN — LIDOCAINE HYDROCHLORIDE 0.2 ML: 10 INJECTION, SOLUTION EPIDURAL; INFILTRATION; INTRACAUDAL; PERINEURAL at 10:28

## 2019-08-29 RX ADMIN — ONDANSETRON 4 MG: 2 INJECTION INTRAMUSCULAR; INTRAVENOUS at 14:16

## 2019-08-29 RX ADMIN — LIDOCAINE HYDROCHLORIDE,EPINEPHRINE BITARTRATE 2 ML: 15; .005 INJECTION, SOLUTION EPIDURAL; INFILTRATION; INTRACAUDAL; PERINEURAL at 13:43

## 2019-08-29 RX ADMIN — Medication 5 MG: at 13:46

## 2019-08-29 RX ADMIN — SENNOSIDES AND DOCUSATE SODIUM 1 TABLET: 8.6; 5 TABLET ORAL at 22:16

## 2019-08-29 RX ADMIN — PENICILLIN G POTASSIUM 5 MILLION UNITS: 5000000 POWDER, FOR SOLUTION INTRAMUSCULAR; INTRAPLEURAL; INTRATHECAL; INTRAVENOUS at 12:21

## 2019-08-29 RX ADMIN — Medication: at 22:16

## 2019-08-29 RX ADMIN — FENTANYL CITRATE 100 MCG: 50 INJECTION INTRAMUSCULAR; INTRAVENOUS at 12:19

## 2019-08-29 RX ADMIN — SODIUM CHLORIDE 2.5 MILLION UNITS: 9 INJECTION, SOLUTION INTRAVENOUS at 16:11

## 2019-08-29 RX ADMIN — Medication 10 ML/HR: at 13:57

## 2019-08-29 RX ADMIN — Medication: at 13:56

## 2019-08-29 RX ADMIN — SODIUM CHLORIDE, POTASSIUM CHLORIDE, SODIUM LACTATE AND CALCIUM CHLORIDE: 600; 310; 30; 20 INJECTION, SOLUTION INTRAVENOUS at 13:28

## 2019-08-29 RX ADMIN — SODIUM CHLORIDE, POTASSIUM CHLORIDE, SODIUM LACTATE AND CALCIUM CHLORIDE 1000 ML: 600; 310; 30; 20 INJECTION, SOLUTION INTRAVENOUS at 12:18

## 2019-08-29 RX ADMIN — BUPIVACAINE HYDROCHLORIDE 7 ML: 2.5 INJECTION, SOLUTION INFILTRATION; PERINEURAL at 14:05

## 2019-08-29 RX ADMIN — PRENATAL VIT W/ FE FUMARATE-FA TAB 27-0.8 MG 1 TABLET: 27-0.8 TAB at 22:16

## 2019-08-29 NOTE — PROGRESS NOTES
Pt is completely dilated.   is aware and is on her way to the hospital.  Pt is comfortable and is not feeling any pain or pressure.
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
## 2019-08-29 NOTE — PROGRESS NOTES
Pt presents to the birthplace this morning with contractions that started around 0400 this morning.  Pt states that the contractions have progressively gotten worse in intensity and have become more frequent. Pt is moving around in bed with the discomfort of the contractions and is obviously uncomfortable.   has been in to see the patient and did do an cervical exam.  Pt is 4 cm dilated.    R: will continue to monitor patient closely.  Support with labor.

## 2019-08-29 NOTE — PROGRESS NOTES
S: Delivery  B: spontaneous Labor,  @ [id number] gestation, GBS positive with antibiotic treatment greater than 4 hours prior to delivery.  A: Patient delivered Vaginal at 1650 with Dr. Giles in attendance. Baby delivered and placed on mother's low abdomen for delayed cord clamping where baby was dried and stimulated. After cord clamped and cut, baby was placed skin to skin on mother's chest within 5 minutes following delivery . Apgars 9/9. Placenta was delivered @ 1655 followed by administration of IM oxytocin. Bonding initiated with mom and baby. Educated mother on importance of exclusive breast feeding, expected feeding readiness cues and encouraged her to observe for feeding cues. Mother informed that breast feeding assistance would be provided. See flowsheet for VS and PP checks. Labor care plan goals met.  R: Expect routine postpartum care. Anticipate first feeding within the hour.

## 2019-08-29 NOTE — PROGRESS NOTES
Pt is getting some relief from the IV fentanyl.  Continues to breath through contractions.. Significant other Manoj at bedside offering support.
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
## 2019-08-29 NOTE — PROGRESS NOTES
Drop in patient's blood pressure. Fluid bolus started. Anesthesia is at bedside. Pt states that she is starting to feel more comfortable with contractions.

## 2019-08-29 NOTE — ANESTHESIA PROCEDURE NOTES
Peripheral nerve/Neuraxial procedure note : epidural catheter  Pre-Procedure    Location: OB    Procedure Times:8/29/2019 1:19 PM and 8/29/2019 2:08 PM  Pre-Anesthestic Checklist: patient identified, IV checked, risks and benefits discussed, informed consent, monitors and equipment checked, pre-op evaluation and at physician/surgeon's request    Timeout  Correct Patient: Yes   Correct Procedure: Yes   Correct Site: Yes   Correct Laterality: N/A   Correct Position: Yes   Site Marked: N/A   .   Procedure Documentation    .    Procedure:    Epidural catheter.  Insertion Site:L3-4  (midline approach) Injection technique: LORT air   Local skin infiltrated with 3 mL of 1% lidocaine.       Patient Prep;mask, sterile gloves, povidone-iodine 7.5% surgical scrub, patient draped.  .  Needle: Touhy needle, [place] Needle Gauge: 18.    Needle Length (Inches) 3.5  # of attempts: 2 and  # of redirects:  1 .   Catheter: 20 G . .  Catheter threaded easily  5 cm epidural space.  .   .    Assessment/Narrative  Paresthesias: No.  .  .  Aspiration negative for heme or CSF  . Test dose of 3 mL lidocaine 1.5% w/ 1:200,000 epinephrine at 13:36.  Test dose negative for signs of intravascular, subdural or intrathecal injection. Sensory Level Left: T7  Sensory Level Right: T8  Comments:  Pt was in a large amount of pain and had difficulty staying still for the epidural placement.  Primip at 6cm when last checked.  Pain is 10/10.  Pt denied bleeding disorders or high blood pressure with the pregnancy.  On first attempt at L2-3 I was MCFP through the ligament when the pt had to almost stand due to contraction pain.  I withdrew the Touhy and the pt bled a moderate amount from the puncture site.  I elected to drop to L3-4 when her contraction was complete to avoid the area of bleeding.  The catheter was easy to place here after one redirect.  Catheter advanced easily.  When the Touhy was removed the pt again began to bleed moderately from the  second puncture site.  I advised the RN (Madisyn) to get an updated platelet level as the bleeding was more than normal and the pt has no other bleeding issues known.  Her pain improved on her right greater than her left.  A second bolus evened out her epidural and she was comfortable.  I will follow up with the pt if needed.8/29/2019 1:36 PM

## 2019-08-29 NOTE — PROGRESS NOTES
Pt was initially still having pain on her left side after the epidural.  Pt was turned onto her left side and the pain has now improved.  Pt is rating her paina 1-2 on a 0-10 pain scale.  Pt has had a few episodes of nausea and vomiting.  Zofran was given.  Pt was also noted to have low blood pressures following the epidural.  Vincent Wellington CRNA was at bedside and ephedrine was given.  After the ephedrine was given the blood pressure cuff was readjusted.  The blood pressures since then have been slightly above patients normal baseline. Will continue to monitor blood pressures per post epidural orders.

## 2019-08-29 NOTE — L&D DELIVERY NOTE
"Delivery Summary    Tess Graf MRN# [id number]   Age: 19 year old YOB: 2000     ASSESSMENT & PLAN:   Tess Graf is a 19 year old  female who presents at 40w6d weeks with spontaneous onset of labor.      Her prenatal course was uncomplicated.  She received her prenatal care initially in [place] but transferred here at 21 weeks.      Her routine prenatal labs were all normal as follows:   O negative, received Rhogam  antibody screen negative  Rubella Not immune   Trepab negative  HepBsAg negative  HIV negative  She passed her 1 hour GTT.   Her GBS is positive    On arrival at the birthplace her cervical exam is as follows:    4/90%/0 station.    She was allowed to labor spontaneously.   She had SROM with clear fluid at 11:40.   She received epidural with good relief of her labor pain.   She labored well and was completely dilated at 15:46.  First stage of labor was 11 hrs 46 min.     She did not feel pressure so was allowed to labor down, and only pushed a few minutes, very well with  viable female infant over an intact perineum at 16:50.  Baby was in OP position and there was a large gush of meconium stained amniotic fluid at time of delivery.  There was a nuchal cord x 1 that was loose and easily reduced prior to delivery of the shoulders. The cord was also wrapped around the body loosely. The infant was then laid on mother's abdomen, received drying and tactile stimulation, along with suctioning from the mouth and nose.  The cord was delayed clamped x 2 and cut by the FOB who was in attendance.  There was spontaneous cry and APGARS at 1 and 5 minutes were 9 and 9 respectively.   Second stage of labor was 1 hrs and 4 minutes.     The placenta delivered spontaneously, intact with a 3VC at 16:55.  Pitocin was given IM after delivery of the placenta.  Third stage of labor was 5 minutes.     Examination of the vaginal walls and perineum revealed bilateral \"abrasions\" vs mild 1st " degree labial lacerations which were not bleeding and not repaired.  Also there was a moderate 2nd degree posterior midline vaginal laceration that was bleeding lightly.  Fundal massage proved that the uterine fundus was firm.  EBL was 130 ml.      The posterior midline laceration was repaired in the usual sterile running fashion using 3-0 chromic suture.  Excellent hemostasis was obtained.  Repeat examination of the vaginal walls and perineum revealed no ongoing bleeding.      A:   viable female infant at 40 6/7 weeks gestation with APGARS of 9 and 9 weighing 6 lbs 13 oz.      GBS positive, treated    Meconium fluid, no evidence for aspiration syndrome.     P:  Mom, dad and baby are doing well after delivery.  Mom plans to breastfeed baby and baby will be rooming in with mom.  I anticipate they will both have a normal course.  Will monitor respiratory status carefully, continue routine cares.     Leonor Giles MD            Labor Event Times    Labor onset date:  19 Onset time:   4:00 AM   Dilation complete date:  19 Complete time:   3:46 PM   Start pushing date/time:  2019 1636      Labor Length    1st Stage (hrs):  11 (min):  46   2nd Stage (hrs):  1 (min):  4   3rd Stage (hrs):  0 (min):  5      Labor Events     labor?:  No   steroids:  None  Labor Type:  Spontaneous  Predominate monitoring during 1st stage:  continuous electronic fetal monitoring     Antibiotics received during labor?:  Yes  Reason for Antibiotics:  GBS  Antibiotics received for GBS:  Penicillin  Antibiotics Given (GBS):  Greater than 4 hours prior to delivery     Rupture identifier:  Sac 1  Rupture date/time: 19 1140   Rupture type:  Spontaneous rupture of membranes occuring during spontaneous labor or augmentation  Fluid color:  Clear  Fluid odor:  Normal     1:1 continuous labor support provided by?:  RN Labor partogram used?:  no      Delivery/Placenta Date and Time    Delivery Date:   "19 Delivery Time:   4:50 PM   Placenta Date/Time:  2019  4:55 PM  Oxytocin given at the time of delivery:  after delivery of placenta     Vaginal Counts     Initial count performed by 2 team members:   Two Team Members   Cassandra Giles       Hallett Suture Hallett Sponges Instruments   Initial counts 2 0 5    Added to count 0 1 10    Final counts 2 1 15    Placed during labor Accounted for at the end of labor   No NA   No NA   No NA    Final count performed by 2 team members:   Two Team Members   cassandra villalba kathleen      Final count correct?:  Yes     Apgars    Living status:  Living   1 Minute 5 Minute 10 Minute 15 Minute 20 Minute   Skin color: 1  1       Heart rate: 2  2       Reflex irritability: 2  2       Muscle tone: 2  2       Respiratory effort: 2  2       Total: 9  9       Apgars assigned by:  HAWK JESSICA     Cord    Vessels:  3 Vessels Complications:  Nuchal   Cord Blood Disposition:  Lab Gases Sent?:  No      [place] Resuscitation    Methods:  None  Output in Delivery Room:  Stool      Measurements    Weight:  6 lb 13 oz Length:  1' 9\"   Head circumference:  33 cm Chest circumference:  33 cm      Skin to Skin and Feeding Plan    Skin to skin initiation date/time: 1841    Skin to skin with:  Mother  Skin to skin end date/time:     How do you plan to feed your baby:  Breastfeeding     Labor Events and Shoulder Dystocia    Fetal Tracing Prior to Delivery:  Category 2  Shoulder dystocia present?:  Neg     Delivery (Maternal) (Provider to Complete) (239384)    Episiotomy:  None  Perineal lacerations:  2nd Repaired?:  Yes   Labial laceration:  bilateral Repaired?:  No   Vaginal laceration?:  No    Cervical laceration?:  No       Blood Loss  Mother: Melany Grafa RICK #[id number]   Start of Mother's Information    IO Blood Loss  19 0400 - 19 1820    None           End of Mother's Information  Mother: Rhonda Graflina RICK #[id number]    "      Delivery - Provider to Complete (175267)    Delivering clinician:  Leonor Giles MD  Attempted Delivery Types (Choose all that apply):  Spontaneous Vaginal Delivery  Delivery Type (Choose the 1 that will go to the Birth History):  Vaginal, Spontaneous   Other personnel:   Provider Role   Shaila Shelby, RN Delivery Nurse   Cassandra David, RN Delivery Nurse         Placenta    Delayed Cord Clamping:  Done  Date/Time:  8/29/2019  4:55 PM  Removal:  Spontaneous  Disposition:  Hospital disposal     Anesthesia    Method:  Epidural  Cervical dilation at placement:  4-7          Presentation and Position    Presentation:  Vertex  Position:  Middle Occiput Posterior           Leonor Giles MD

## 2019-08-30 LAB
HGB BLD-MCNC: 10 G/DL (ref 11.7–15.7)
T PALLIDUM AB SER QL: NONREACTIVE

## 2019-08-30 PROCEDURE — 36415 COLL VENOUS BLD VENIPUNCTURE: CPT | Performed by: FAMILY MEDICINE

## 2019-08-30 PROCEDURE — 85018 HEMOGLOBIN: CPT | Performed by: FAMILY MEDICINE

## 2019-08-30 PROCEDURE — 12000000 ZZH R&B MED SURG/OB

## 2019-08-30 PROCEDURE — 25000132 ZZH RX MED GY IP 250 OP 250 PS 637: Performed by: FAMILY MEDICINE

## 2019-08-30 RX ADMIN — ACETAMINOPHEN 650 MG: 325 TABLET ORAL at 18:27

## 2019-08-30 RX ADMIN — SENNOSIDES AND DOCUSATE SODIUM 1 TABLET: 8.6; 5 TABLET ORAL at 08:30

## 2019-08-30 RX ADMIN — ACETAMINOPHEN 650 MG: 325 TABLET ORAL at 08:30

## 2019-08-30 RX ADMIN — ACETAMINOPHEN 650 MG: 325 TABLET ORAL at 22:33

## 2019-08-30 RX ADMIN — ACETAMINOPHEN 650 MG: 325 TABLET ORAL at 14:16

## 2019-08-30 RX ADMIN — IBUPROFEN 800 MG: 800 TABLET ORAL at 04:36

## 2019-08-30 RX ADMIN — Medication: at 16:53

## 2019-08-30 RX ADMIN — DIBUCAINE: 1 OINTMENT TOPICAL at 04:36

## 2019-08-30 RX ADMIN — ACETAMINOPHEN 650 MG: 325 TABLET ORAL at 01:22

## 2019-08-30 RX ADMIN — IBUPROFEN 800 MG: 800 TABLET ORAL at 11:38

## 2019-08-30 RX ADMIN — SENNOSIDES AND DOCUSATE SODIUM 1 TABLET: 8.6; 5 TABLET ORAL at 21:10

## 2019-08-30 RX ADMIN — IBUPROFEN 800 MG: 800 TABLET ORAL at 18:27

## 2019-08-30 RX ADMIN — PRENATAL VIT W/ FE FUMARATE-FA TAB 27-0.8 MG 1 TABLET: 27-0.8 TAB at 21:10

## 2019-08-30 NOTE — ANESTHESIA POSTPROCEDURE EVALUATION
Patient: Tess Graf    * No procedures listed *    Diagnosis:* No pre-op diagnosis entered *  Diagnosis Additional Information: No value filed.    Anesthesia Type:  No value filed.    Note:  Anesthesia Post Evaluation    Patient location during evaluation: Floor and Bedside  Patient participation: Able to fully participate in evaluation  Level of consciousness: awake  Pain management: adequate  Airway patency: patent  Cardiovascular status: acceptable and hemodynamically stable  Respiratory status: acceptable, room air and nonlabored ventilation  Hydration status: stable  PONV: none     Anesthetic complications: None    Comments: Patient was happy with the anesthesia care received and no anesthesia related complications were noted.  I will follow up with the patient again if it is needed.        Last vitals:  Vitals:    08/29/19 2035 08/30/19 0030 08/30/19 0830   BP: 126/59 113/54 103/47   Pulse: 60     Resp: 20 18 16   Temp: 98.7  F (37.1  C) 98  F (36.7  C) 98  F (36.7  C)   SpO2:            Electronically Signed By: EARL Liang CRNA  August 30, 2019  11:15 AM

## 2019-08-30 NOTE — PROGRESS NOTES
[facility] Obstetrics Post-Partum Progress Note          Assessment and Plan:    Assessment:   Post-partum day #1  Normal spontaneous vaginal delivery  L&D complications: Malpresentation (OP)     Lactation with nipple irritation    Doing well overall.   No excessive bleeding  Pain well-controlled.      Plan:   Ambulation encouraged  Breast feeding strategies discussed, will add APNO ointment for the nipples  Pain control measures as needed  Uterine massage  Anticipate discharge tomorrow           Interval History:   Doing well.  Pain is well-controlled.  No fevers.  No history of foul-smelling vaginal discharge.  Good appetite.  Denies chest pain, shortness of breath, nausea or vomiting.  Vaginal bleeding is similar to a heavy menstrual flow.  Ambulatory.  Breastfeeding well but the left nipple bled with the last feeding and has a small scab. Using a shield and lanolin.           Significant Problems:    as above          Review of Systems:    as above.           Medications:     All medications related to the patient's surgery have been reviewed  Current Facility-Administered Medications   Medication     acetaminophen (TYLENOL) tablet 650 mg     albuterol (PROAIR HFA/PROVENTIL HFA/VENTOLIN HFA) 108 (90 Base) MCG/ACT inhaler 2 puff     APNO ointment     [START ON 8/31/2019] bisacodyl (DULCOLAX) Suppository 10 mg     Blood Bank will determine if patient is eligible for and the proper dosage of Rho (D) immune globulin (RhoGam)     ibuprofen (ADVIL/MOTRIN) tablet 800 mg     lactated ringers BOLUS 1,000 mL     lanolin ointment     [START ON 8/31/2019] magnesium hydroxide (MILK OF MAGNESIA) suspension 30 mL     [START ON 8/31/2019] measles, mumps and rubella vaccine (MMR) injection 0.5 mL     misoprostol (CYTOTEC) suppository 800 mcg     naloxone (NARCAN) injection 0.1-0.4 mg     NO Rho (D)  immune globulin (RhoGam) needed  - mother INELIGIBLE     No Tdap Needed - Assessment: Patient does not need Tdap vaccine      oxyCODONE (ROXICODONE) tablet 5 mg     oxytocin (PITOCIN) 30 units in 500 mL 0.9% NaCl infusion     oxytocin (PITOCIN) 30 units in 500 mL 0.9% NaCl infusion     oxytocin (PITOCIN) injection 10 Units     prenatal multivitamin w/iron per tablet 1 tablet     senna-docusate (SENOKOT-S/PERICOLACE) 8.6-50 MG per tablet 1 tablet    Or     senna-docusate (SENOKOT-S/PERICOLACE) 8.6-50 MG per tablet 2 tablet     tranexamic acid (CYKLOKAPRON) 1 g in sodium chloride 0.9 % 50 mL bolus             Physical Exam:     All vitals stable  Patient Vitals for the past 24 hrs:   BP Temp Temp src Pulse Heart Rate Resp SpO2   08/30/19 0830 103/47 98  F (36.7  C) Oral -- 62 16 --   08/30/19 0030 113/54 98  F (36.7  C) Oral -- 60 18 --   08/29/19 2035 126/59 98.7  F (37.1  C) Oral 60 -- 20 --   08/29/19 1845 105/51 -- -- 65 -- -- --   08/29/19 1830 123/59 -- -- 59 -- -- --   08/29/19 1815 121/59 -- -- 55 -- -- --   08/29/19 1800 107/66 -- -- 69 -- -- --   08/29/19 1745 116/57 -- -- 62 -- -- --   08/29/19 1730 108/57 -- -- -- -- -- --   08/29/19 1715 101/54 -- -- -- -- -- --   08/29/19 1700 123/58 -- -- 70 -- 20 --   08/29/19 1626 -- -- -- -- -- -- 94 %   08/29/19 1624 -- -- -- -- -- -- 93 %   08/29/19 1620 -- -- -- -- -- -- 94 %   08/29/19 1559 -- -- -- -- -- -- 94 %   08/29/19 1554 -- -- -- -- -- -- 93 %   08/29/19 1549 -- -- -- -- -- -- 94 %   08/29/19 1547 -- -- -- -- -- -- 94 %   08/29/19 1545 110/61 98.4  F (36.9  C) Oral -- -- 20 --   08/29/19 1544 -- -- -- -- -- -- 94 %   08/29/19 1500 118/61 -- -- -- -- -- 97 %   08/29/19 1455 118/62 -- -- 55 -- -- 97 %   08/29/19 1420 135/64 -- -- -- -- -- --   08/29/19 1419 -- -- -- -- -- -- 98 %   08/29/19 1418 137/66 98.4  F (36.9  C) Oral -- -- -- --   08/29/19 1416 (!) 142/69 -- -- -- -- -- --   08/29/19 1414 (!) 144/70 -- -- -- -- -- 98 %   08/29/19 1412 137/73 -- -- -- -- -- --   08/29/19 1400 134/72 -- -- -- -- -- --   08/29/19 1359 -- -- -- -- -- -- 97 %     Vitals noted.  Patient alert,  oriented, and in no acute distress.   CV:  RRR without murmur.   Respiratory:  Lungs clear to auscultation bilaterally.   Abdomen:  Soft, tender with palpation of the uterine fundus which is firm and below the level of the umbilicus, nondistended with good bowel sounds and no masses or hepatosplenomegaly.            Data:     Lab Results   Component Value Date    HGB 10.0 08/30/2019        Results for orders placed or performed during the hospital encounter of 08/29/19   Treponema Abs w Reflex to RPR and Titer   Result Value Ref Range    Treponema Antibodies Nonreactive NR^Nonreactive   Platelet count   Result Value Ref Range    Platelet Count 248 150 - 450 10e9/L   Hemoglobin   Result Value Ref Range    Hemoglobin 10.0 (L) 11.7 - 15.7 g/dL   ABO and Rh   Result Value Ref Range    ABO O     RH(D) Neg     Specimen Expires 09/01/2019    Rho (D) immune globulin (RhoGam) Lab Study   Result Value Ref Range    Rhogam Order Order received    Rh negative immune globulin study   Result Value Ref Range    Blood Bank Comment       Not suitable for Rh Immune Globulin  BABY IS RH NEGATIVE          Leonor Giles MD

## 2019-08-30 NOTE — ANESTHESIA CARE TRANSFER NOTE
Patient: Tess Graf    * No procedures listed *    Diagnosis: * No pre-op diagnosis entered *  Diagnosis Additional Information: No value filed.    Anesthesia Type:   No value filed.     Note:  Airway :Room Air  Patient transferred to:Labor and Delivery  Handoff Report: Identifed the Patient, Identified the Reponsible Provider, Reviewed the pertinent medical history, Discussed the surgical course, Reviewed Intra-OP anesthesia mangement and issues during anesthesia, Set expectations for post-procedure period and Allowed opportunity for questions and acknowledgement of understanding      Vitals: (Last set prior to Anesthesia Care Transfer)              Electronically Signed By: EARL Liang CRNA  August 30, 2019  11:15 AM

## 2019-08-30 NOTE — PLAN OF CARE
S: Shift review   B:Tess is a  who delivered vaginally on   A: VSS, patient is independent with mobility, pain well controlled with p.o. pain meds. Handles baby with confidence., Nipples inverted, using the nipple shield BF almost independently.  R: Continue with routine PP care Monitor nipple integrity and assist with latch prn. May start pumping to assist with bringing nipple outward.

## 2019-08-31 VITALS
TEMPERATURE: 98.2 F | OXYGEN SATURATION: 97 % | DIASTOLIC BLOOD PRESSURE: 60 MMHG | SYSTOLIC BLOOD PRESSURE: 122 MMHG | HEART RATE: 65 BPM | RESPIRATION RATE: 18 BRPM

## 2019-08-31 PROCEDURE — 72200001 ZZH LABOR CARE VAGINAL DELIVERY SINGLE

## 2019-08-31 PROCEDURE — 90707 MMR VACCINE SC: CPT | Performed by: FAMILY MEDICINE

## 2019-08-31 PROCEDURE — 25000132 ZZH RX MED GY IP 250 OP 250 PS 637: Performed by: FAMILY MEDICINE

## 2019-08-31 PROCEDURE — 25000128 H RX IP 250 OP 636: Performed by: FAMILY MEDICINE

## 2019-08-31 RX ORDER — ACETAMINOPHEN 325 MG/1
650 TABLET ORAL EVERY 4 HOURS PRN
COMMUNITY
Start: 2019-08-31 | End: 2022-03-10

## 2019-08-31 RX ORDER — IBUPROFEN 600 MG/1
600 TABLET, FILM COATED ORAL EVERY 6 HOURS PRN
Qty: 90 TABLET | Refills: 0 | Status: SHIPPED | OUTPATIENT
Start: 2019-08-31 | End: 2022-03-10

## 2019-08-31 RX ORDER — LANOLIN 100 %
OINTMENT (GRAM) TOPICAL
Start: 2019-08-31 | End: 2019-10-29

## 2019-08-31 RX ORDER — AMOXICILLIN 250 MG
1 CAPSULE ORAL 2 TIMES DAILY PRN
Qty: 60 TABLET | Refills: 0 | Status: SHIPPED | OUTPATIENT
Start: 2019-08-31 | End: 2022-03-10

## 2019-08-31 RX ADMIN — IBUPROFEN 800 MG: 800 TABLET ORAL at 09:08

## 2019-08-31 RX ADMIN — IBUPROFEN 800 MG: 800 TABLET ORAL at 00:09

## 2019-08-31 RX ADMIN — ACETAMINOPHEN 650 MG: 325 TABLET ORAL at 09:08

## 2019-08-31 RX ADMIN — MEASLES, MUMPS, AND RUBELLA VIRUS VACCINE LIVE 0.5 ML: 1000; 12500; 1000 INJECTION, POWDER, LYOPHILIZED, FOR SUSPENSION SUBCUTANEOUS at 10:59

## 2019-08-31 RX ADMIN — ACETAMINOPHEN 650 MG: 325 TABLET ORAL at 02:18

## 2019-08-31 NOTE — PROGRESS NOTES
Care Transitions Progress note    Reason for Follow up: Consult placed for Hensley score of 10. CTS attempted to visit with the patient to complete assessment on 8/30/2019 at around 1600, patient had many visitors at that time. Attempted to visit today at 10:00, Nurse reported that the patient is napping currently.    Anticipated DC Needs: Awaiting call from nursing when the patient is awake to complete the assessment.    Next Steps: CTS consult for 10 on Hensley assessment. Mental health resources placed in DC paper work for the patients use post discharge.    Anitra Lai RN Care Coordinator  [facility] [phone]  [facility] [phone]

## 2019-08-31 NOTE — DISCHARGE INSTRUCTIONS
[facility] Discharge Instructions     Discharge disposition:  Discharged to home       Diet:  Regular       Activity No sex for 6 week(s)       Follow-up: Follow up with Dr. Giles in 6 weeks       Additional instructions: The [facility] staff is available 24 hours 7 days for questions about you or your baby.  Please don't hesitate to call with any concerns.        Additional Patient Information:  Enjoy beautiful little Skarlette!    Postpartum Vaginal Delivery Instructions    Activity       Ask family and friends for help when you need it.    Do not place anything in your vagina for 6 weeks.    You are not restricted on other activities, but take it easy for a few weeks to allow your body to recover from delivery.  You are able to do any activities you feel up to that point.    No driving until you have stopped taking your pain medications (usually two weeks after delivery).     Call your health care provider if you have any of these symptoms:       Increased pain, swelling, redness, or fluid around your stiches from an episiotomy or perineal tear.    A fever above 100.4 F (38 C) with or without chills when placing a thermometer under your tongue.    You soak a sanitary pad with blood within 1 hour, or you see blood clots larger than a golf ball.    Bleeding that lasts more than 6 weeks.    Vaginal discharge that smells bad.    Severe pain, cramping or tenderness in your lower belly area.    A need to urinate more frequently (use the toilet more often), more urgently (use the toilet very quickly), or it burns when you urinate.    Nausea and vomiting.    Redness, swelling or pain around a vein in your leg.    Problems breastfeeding or a red or painful area on your breast.    Chest pain and cough or are gasping for air.    Problems coping with sadness, anxiety, or depression.  If you have any concerns about hurting yourself or the baby, call your provider immediately.     You have questions  or concerns after you return home.     Keep your hands clean:  Always wash your hands before touching your perineal area and stitches.  This helps reduce your risk of infection.  If your hands aren't dirty, you may use an alcohol hand-rub to clean your hands. Keep your nails clean and short.          Behavioral/Mental Health Resources  [place], [place], [facility], [place], [place], [place], [place], [place] and [facility]    **Patient should check with their health insurance to identify providers in network**    Crisis Lines:   Text 132936 from anywhere in the [place] to text with a trained Crisis Counselor   -[place]wide: [place] Crisis Connection: (148) 408-9652/1-504.814.7664   -[place]: (207) 224-2610    -[facility]/ [place]/ [place]/ [place]/ [place]: 1-930.513.7243   -[facility]: [facility] Crisis: [phone]   -[facility]: [facility] Crisis Team (697) 307-9755 or  1-981.903.3752     Call the National Suicide Prevention Lifeline at 6-340-108-KOPA (3944) to be connected to a  counselor at a crisis center in your area if you, a family member, or friend are experiencing   thoughts of suicide. The call is FREE, confidential, and always available.       National [place] on Mental Illness of [place] ([facility]) provides support groups and  educational programs. Visit www.namihelps.org or call the [facility] Helpline at 1-473.168.9137  Or 537-712-7303 for further information.       Crisis Mobile Serivces:   -[place]: 9Cookies (046) [phone]   -[facility]/ [place]/ [place]/ [place]/ [place]: [phone]   -[facility]: [facility] [phone]   -[facility]: (400) [phone] or ([phone]- [phone]    Chemical Dependency Detox:  - [place] [facility]:  [phone] - can ask for other recommendations  - [facility]/[place]([facility]):  [phone]  - [facility]: [phone] [facility]):  [phone]  - [place] Detox :  [phone] [facility]  -  [place] s ([facility]):  [phone]  - [place] ([facility]):  [phone]    Chemical Dependency Assessment:   - [facility]: [phone]   - Behavioral Health Providers, can help find a provider near you:  [phone]  - No insurance- call county of residence and ask about applying for a Rule 25    Counseling/psychotherapy:  - Associated clinic of psychology - [place],/[place]/ [facility]/[place] /other locations: [phone]  - Behavioral Healthcare Providers- Can help find a provider near you:  [phone]  - [facility] Health Services-[place]:  [phone]  - [facility]- [place]:  [phone]  - [facility]- [facility]/[place]:  [phone]  - [facility]-[place]: [phone]  - [facility]- [place]/[facility]/other locations:  [phone]  - Family Based Therapy [facility]- [facility]/[place]:  [phone]  - [facility] - [place]:  [phone]  - [facility]:  [phone]  [facility]- [place]-based in [place]:  [phone]  - [phone]  - [facility]- [place]: [phone]  - [facility]- [facility]:  [phone]  - [facility]- [place] [phone]  - [facility] counseling located in [place] (not affiliated with [place]): [phone]  - [facility]- [place]:  [phone]/[place]: [phone] and other locations.  - [facility]- [place]: [phone]  - Psychiatric Recovery- [place]:  [phone]  - Therapeutic Services Agency- [place]: [phone]  - Walk in counseling center (Free counseling services)- [facility] and [place]: [phone]     Psychiatry/ Mental Health Medication management:  - Associated clinic of  psychology- [place],/[place]/[facility]/ [place]/ multiple locations: [phone]  - Behavioral Healthcare Providers- Can help find a provider near you, if you have preferred one or [facility] they can identify who is in network and assist with scheduling an appointment:  [phone]  - [facility]: [place]/multiple locations : [phone]  - [facility] - Dr Chon Perez and other associates. Collaborative model  with PCP involvement:  [phone] (requires PCP referral specifically)  - [facility]- [place]:  [phone]  - [facility]: [place]:   [phone] (Requires individual to be engaged in counseling)  - [facility]- [place]: [phone] [place]/[facility]:  [phone]/[place]:  [phone]/ [place]:  [phone]  - Psychiatric Recovery- [place]:   [phone]  - [facility]- [place], [phone]  - [facility] Psychiatry - Medical students who rotate yearly:  [phone]    County Numbers  - [facility]: [phone]  - [facility]: [phone]  - [facility]: [phone]  - [facility] : [phone]  - [facility]: [phone]  - [facility]: [phone]  - [place]: [phone]  - [place]: [phone]  - [place]: [phone]  - [facility]: [phone]      Chemical Dependency  [facility]] and [place]                         [phone] [address]                                             [place], [place] [address] [phone]   [place]   [place]: [phone]  [place]: [phone] [facility]: [phone]   Sobriety [facility]: [phone]   Alcohol Hotline   3-773-527-2417     Teen Chemical Dependency  [facility] Adolescent Out[facility]   [place]: [phone]   [place] Teen Challenge   Teen  and adult chemical dependency   [phone]   Mental Health  Better Mental Health   [address] [facility] [address] [address], [place] [address]  [phone]   [facility] Mental [facility] Center   24 hour mental health crisis services   [phone] or [phone]  [place]: [phone]  [place]: [phone]  [place]: [phone]     [facility]: [phone]  [place]: [phone]   [facility]: [phone]     National Suicide Prevention Lifeline   1-407.159.8734 1-133.555.7504 Deaf/ hard of hearing    Text Crisis hotline   Text home to 701133      Disaster Distress helpline   1-485.934.3029  Text TalkWithUs to 36571   [facility] Crisis Line   1-355.251.1934   Dual (Chemical Dependency & Mental Health)  [facility]  Chemical dependency, depression, anger, trauma, loss   [place]: [phone]  [place]: [phone] Women s Recovery and Support Services   Chemical dependency and mental health   [place]: [phone]     **Please note that this list does not include all agencies that provide services**    Care Transitions Team at [facility] [phone]

## 2019-08-31 NOTE — CONSULTS
Reason for Referral: RN Care Coordinator consult for postpartum assessment due to score of 10 on the EPDS    Marital Status: single    Children: 1    Children Living with Patient: Yes    Education: high school and technical college    Occupation: PCA    ESTABLISHED PROVIDERS    Psychiatrist: No   Provider Name: None   Therapist: No   Provider Name: None     Other Providers:  (, CADI worker, group home, guardian/conservator etc)  No     Mental Health Complaints: slight depression, Patients mom had extreme post partum deppression    Suicide: Evidence of Ideation: No    History of Suicide Attempt: No    Homicide: Evidence of ideation: No    History of commitment: No    History of psychiatric hospitalization: No    General/Affect: Appropriate/mood-congruent    Mood: normal affect    Presenting Problem: RN Care Coordinator met with pt this morning to introduce self/role, perform assessment, and discuss resources. RN has also reviewed pt records. Pt recently delivered a baby girl on 8/29/19. RN consult for postpartum assessment due to score of 10 on the EPDS      Assessment:  RN Care Coordinator inquired about any past mental health history, pt shared that she has a history of depression but nothing current. Pt has no experience with postpartum depressions/anxiety. Pt has no medications in the past. RN encouraged pt to alert her MD of any concerns at her follow-up appointment. Pt will be provided with resources in her DC instructions.     Plan: Pt feels prepared for discharge and has no additional questions/concerns. Parents are supportive of one another, bonding well with baby, no concerns. No further SW follow-up indicated. Pt and baby to discharge today with above mentioned resources.    Anitra Lai RN

## 2019-08-31 NOTE — PROGRESS NOTES
S: Shift review   B:Tess is a  who delivered vaginally on   A: VSS, patient is independent with mobility, pain well controlled with p.o. pain meds. Handles baby with confidence.  Independent with breast feeding. Using hydrogel and apno cream for nipple soreness.   R: Continue with routine PP care

## 2019-08-31 NOTE — PLAN OF CARE
S: Discharge  to home      B: Patient had a Vaginal delivery with no complications. Baby girl Baby's name Piero, breast: . Support person's name Manoj.     A: VSS, hgb 10.0 light vag flow, minimal pain using an occasional ibuprofen or tylenol prn. BF independently. All PP education completed with pt and SO, all questions answered. Bonding well. PPD score 10, Dr keene and  here discussed s/s of PPD and resources. Pt has a great support system.Nipples tender left nipple still scabbed but healing, using a nipple shield as nipples are inverted. Lactation consultant notified, pt will call her if having issues with BF.    R: All Discharge instructions reviewed and questions answered.Pt took signed discontinue instructions with her.  Belongings gathered and returned to the patient. Agreed to follow up in 6 weeks or sooner with any question or concerns.     Nursing Discharge Checklist:    Pneumovax screened and given, if appropriate: N/A  Influenza vaccine screened and given, if appropriate: N/A  Staples removed (): N/A  Breast milk returned: N/A  Hydrogel pads sent home:YES  Birth Certificate Done: YES

## 2019-08-31 NOTE — DISCHARGE SUMMARY
[facility] Discharge Summary    Tess Graf MRN# [id number]   Age: 19 year old YOB: 2000     Date of Admission:  2019  Date of Discharge::  2019  Admitting Physician:  Leonor Giles MD  Discharge Physician:  Leonor Giles MD     Home clinic: [facility]          Admission Diagnoses:   Supervision of normal first pregnancy in third trimester  Active labor  GBS positive  Rubella non immune  Mild intermittent asthma, exercise induced          Discharge Diagnosis:     Intrauterine pregnancy at 40 6/7 weeks gestation  Lactation  Mild depression, postpartum  Mild intermittent asthma, stable  Need for MMR vaccine          Procedures:   Procedure(s): Repair of 2nd degree vaginal laceration              Medications Prior to Admission:     Medications Prior to Admission   Medication Sig Dispense Refill Last Dose     albuterol (PROAIR HFA/PROVENTIL HFA/VENTOLIN HFA) 108 (90 Base) MCG/ACT inhaler Inhale 2 puffs into the lungs every 4 hours as needed for shortness of breath / dyspnea or wheezing   Past Month at Unknown time     Prenatal Vit-Fe Fumarate-FA (PRENATAL MULTIVITAMIN W/IRON) 27-0.8 MG tablet Take 1 tablet by mouth daily 120 tablet 3 Past Week at Unknown time     [DISCONTINUED] acetaminophen (TYLENOL) 500 MG tablet Take 1-2 tablets (500-1,000 mg) by mouth every 6 hours as needed for mild pain   Past Month at Unknown time             Discharge Medications:     Current Discharge Medication List      START taking these medications    Details   APNO CREA ointment Use as needed for dry, cracked or sore nipples. No need to wash off.    Associated Diagnoses: Postpartum care and examination of lactating mother      ibuprofen (ADVIL/MOTRIN) 600 MG tablet Take 1 tablet (600 mg) by mouth every 6 hours as needed for other (cramping)  Qty: 90 tablet, Refills: 0    Associated Diagnoses:  (normal spontaneous vaginal delivery)      lanolin  ointment Use as needed for dry, cracked or sore nipples. No need to wash off.    Associated Diagnoses: Postpartum care and examination of lactating mother      senna-docusate (SENOKOT-S/PERICOLACE) 8.6-50 MG tablet Take 1 tablet by mouth 2 times daily as needed for constipation  Qty: 60 tablet, Refills: 0    Associated Diagnoses:  (normal spontaneous vaginal delivery)         CONTINUE these medications which have CHANGED    Details   acetaminophen (TYLENOL) 325 MG tablet Take 2 tablets (650 mg) by mouth every 4 hours as needed for mild pain or fever (greater than or equal to 38  C /100.4  F (oral) or 38.5  C/ 101.4  F (core).)    Associated Diagnoses:  (normal spontaneous vaginal delivery)         CONTINUE these medications which have NOT CHANGED    Details   albuterol (PROAIR HFA/PROVENTIL HFA/VENTOLIN HFA) 108 (90 Base) MCG/ACT inhaler Inhale 2 puffs into the lungs every 4 hours as needed for shortness of breath / dyspnea or wheezing    Associated Diagnoses: Exercise-induced asthma      Prenatal Vit-Fe Fumarate-FA (PRENATAL MULTIVITAMIN W/IRON) 27-0.8 MG tablet Take 1 tablet by mouth daily  Qty: 120 tablet, Refills: 3    Comments: [place] chewable complete-  2 daily.                   Consultations:   No consultations were requested during this admission          Brief History of Labor or Admission:   Patient was admitted with spontaneous onset of labor.  She went on to deliver a healthy baby girl without complications.  Please see delivery summary for full details.              Hospital Course:   The patient's hospital course was unremarkable.  She recovered as anticipated and experienced no post-operative complications.  On discharge, her pain was well controlled. Vaginal bleeding is similar to peak menstrual flow.  Voiding without difficulty.  Ambulating well and tolerating a normal diet.  No fever or significant wound drainage.  Breastfeeding well.  Infant is stable.   She was discharged on  post-partum day #2.    On the day of discharge her exam is as follows:    Vitals:    08/30/19 1527 08/30/19 1600 08/31/19 0148 08/31/19 0825   BP: 122/64  119/52 122/60   Pulse: 87 70  65   Resp: 18 18 18 18   Temp: 97.9  F (36.6  C) 97.4  F (36.3  C) 98  F (36.7  C) 98.2  F (36.8  C)   TempSrc: Oral Oral Oral Oral   SpO2: 97%         Vitals noted.  Patient alert, oriented, and in no acute distress. CV:  RRR without murmur. Respiratory:  Lungs clear to auscultation bilaterally. Abdomen:  Soft, tender with palpation of the uterine fundus which is firm and below the level of the umbilicus, nondistended with good bowel sounds and no masses or hepatosplenomegaly.  Extremities warm and dry without significant edema.       Post-partum hemoglobin:   Hemoglobin   Date Value Ref Range Status   08/30/2019 10.0 (L) 11.7 - 15.7 g/dL Final             Discharge Instructions and Follow-Up:   Discharge diet: Regular   Discharge activity: No sex for 6 week(s)   Discharge follow-up: Follow up with Dr. Giles in 6 weeks   Wound care: Tub soaks bid as able           Discharge Disposition:   Discharged to home      Attestation:  I have reviewed today's vital signs, notes, medications, labs and imaging.    Leonor Giles MD

## 2019-09-04 NOTE — PROGRESS NOTES
"Tessalecia Graf  Gender: female  : 2000  [address]  [place] [id number] [phone] (home)   Medical Record: [id number]  Primary Care Provider: No Ref-Primary, Physician       [facility]   ?   Discharge Phone Call: Key Words/Key Times     How are you and the baby? good    How are feedings going? good    Voiding & Stooling? good    Any questions or concerns? no    Follow-up appointment? \"we had her appt yesterday\"      We want to provide excellent care here at [facility]. Do you have any feedback for us that would help us improve? no    Call back COMMENTS:   \"everything was good\"      Attempted Calls:   __[phone] discharge callback completed per JEWEL López RN_______     __________    "

## 2019-10-29 ENCOUNTER — PRENATAL OFFICE VISIT (OUTPATIENT)
Dept: FAMILY MEDICINE | Facility: CLINIC | Age: 19
End: 2019-10-29
Payer: COMMERCIAL

## 2019-10-29 VITALS
RESPIRATION RATE: 12 BRPM | OXYGEN SATURATION: 96 % | DIASTOLIC BLOOD PRESSURE: 70 MMHG | TEMPERATURE: 97.3 F | HEART RATE: 86 BPM | WEIGHT: 206 LBS | SYSTOLIC BLOOD PRESSURE: 110 MMHG | BODY MASS INDEX: 31.32 KG/M2

## 2019-10-29 DIAGNOSIS — D62 ANEMIA DUE TO BLOOD LOSS, ACUTE: ICD-10-CM

## 2019-10-29 LAB — HGB BLD-MCNC: 12.8 G/DL (ref 11.7–15.7)

## 2019-10-29 PROCEDURE — 36415 COLL VENOUS BLD VENIPUNCTURE: CPT | Performed by: FAMILY MEDICINE

## 2019-10-29 PROCEDURE — 99207 ZZC POST PARTUM EXAM: CPT | Performed by: FAMILY MEDICINE

## 2019-10-29 PROCEDURE — 85018 HEMOGLOBIN: CPT | Performed by: FAMILY MEDICINE

## 2019-10-29 ASSESSMENT — PAIN SCALES - GENERAL: PAINLEVEL: NO PAIN (0)
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
## 2019-10-30 PROBLEM — Z67.91 BLOOD TYPE, RH NEGATIVE: Status: RESOLVED | Noted: 2019-02-19 | Resolved: 2019-10-30

## 2019-10-30 PROBLEM — O99.820 GBS (GROUP B STREPTOCOCCUS CARRIER), +RV CULTURE, CURRENTLY PREGNANT: Status: RESOLVED | Noted: 2019-08-19 | Resolved: 2019-10-30

## 2019-10-30 PROBLEM — O09.93 SUPERVISION OF HIGH RISK PREGNANCY IN THIRD TRIMESTER: Status: RESOLVED | Noted: 2019-07-24 | Resolved: 2019-10-30

## 2019-10-30 PROBLEM — O09.899 RUBELLA NON-IMMUNE STATUS, ANTEPARTUM: Status: RESOLVED | Noted: 2019-02-19 | Resolved: 2019-10-30

## 2019-10-30 PROBLEM — Z28.39 RUBELLA NON-IMMUNE STATUS, ANTEPARTUM: Status: RESOLVED | Noted: 2019-02-19 | Resolved: 2019-10-30

## 2019-10-31 ENCOUNTER — TELEPHONE (OUTPATIENT)
Dept: FAMILY MEDICINE | Facility: CLINIC | Age: 19
End: 2019-10-31

## 2019-10-31 NOTE — TELEPHONE ENCOUNTER
----- Message from Leonor Giles MD sent at 10/31/2019  2:39 PM CDT -----  Please notify patient with nl result. Send copy of labs.   Leonor Giles MD

## 2019-10-31 NOTE — TELEPHONE ENCOUNTER
Left message for patient to call back. Please advise of normal results below.   Maricruz Ha on 10/31/2019 at 4:35 PM

## 2019-12-09 ENCOUNTER — HOSPITAL ENCOUNTER (EMERGENCY)
Facility: CLINIC | Age: 19
Discharge: HOME | End: 2019-12-09
Attending: PHYSICIAN ASSISTANT | Admitting: PHYSICIAN ASSISTANT
Payer: COMMERCIAL

## 2019-12-09 ENCOUNTER — NURSE TRIAGE (OUTPATIENT)
Dept: NURSING | Facility: CLINIC | Age: 19
End: 2019-12-09

## 2019-12-09 VITALS
SYSTOLIC BLOOD PRESSURE: 111 MMHG | OXYGEN SATURATION: 97 % | TEMPERATURE: 96.7 F | DIASTOLIC BLOOD PRESSURE: 74 MMHG | RESPIRATION RATE: 20 BRPM

## 2019-12-09 DIAGNOSIS — N61.0 MASTITIS: ICD-10-CM

## 2019-12-09 PROCEDURE — 99282 EMERGENCY DEPT VISIT SF MDM: CPT | Performed by: PHYSICIAN ASSISTANT

## 2019-12-09 PROCEDURE — 99284 EMERGENCY DEPT VISIT MOD MDM: CPT | Mod: Z6 | Performed by: PHYSICIAN ASSISTANT

## 2019-12-09 RX ORDER — CEPHALEXIN 500 MG/1
500 CAPSULE ORAL 4 TIMES DAILY
Qty: 40 CAPSULE | Refills: 0 | Status: SHIPPED | OUTPATIENT
Start: 2019-12-09 | End: 2019-12-19

## 2019-12-09 NOTE — ED AVS SNAPSHOT
[facility] Emergency Department  [address] [place] [address]  Phone:  [phone]  Fax:  [phone]                                    Tess Graf   MRN: [id number]    Department:  [facility] Emergency Department   Date of Visit:  12/9/2019           After Visit Summary Signature Page    I have received my discharge instructions, and my questions have been answered. I have discussed any challenges I see with this plan with the nurse or doctor.    ..........................................................................................................................................  Patient/Patient Representative Signature      ..........................................................................................................................................  Patient Representative Print Name and Relationship to Patient    ..................................................               ................................................  Date                                   Time    ..........................................................................................................................................  Reviewed by Signature/Title    ...................................................              ..............................................  Date                                               Time          22EPIC Rev 08/18

## 2019-12-09 NOTE — ED PROVIDER NOTES
History     Chief Complaint   Patient presents with     Breast Problem     left     The history is provided by the patient.     Tess Graf is a 19 year old female who presents to the emergency department for breast problem. Patient reports having bilateral breast pain, firm breasts, left one worse than right. She states it has been red, warm to touch, painful and swollen since last night. She is currently 3 months post partum and still nursing. She does pump about once a week. Denies any bloody discharge. She has never had this before.  Denies any chills, nausea, or vomiting.  This is her first baby.    Allergies:  Allergies   Allergen Reactions     Nuvaring Nausea       Problem List:    Patient Active Problem List    Diagnosis Date Noted     Postpartum care and examination of lactating mother 08/29/2019     Priority: Medium     Reactive airway disease 08/12/2019     Priority: Medium     Overview:   uses inhaler with hay or nikki work       Exercise-induced asthma 04/15/2019     Priority: Medium     Shoulder pain, right 05/29/2015     Priority: Medium     Pes planus 09/23/2014     Priority: Medium        Past Medical History:    Past Medical History:   Diagnosis Date     Anxiety      Blood type, Rh negative 2/19/2019     Concussion 05/29/2015     Depression        Past Surgical History:    Past Surgical History:   Procedure Laterality Date     NO HISTORY OF SURGERY         Family History:    Family History   Problem Relation Age of Onset     Obesity Mother      Depression Maternal Grandmother      Anxiety Disorder Maternal Grandmother      No Known Problems Half-Brother      No Known Problems Half-Brother        Social History:  Marital Status:  Single [1]  Social History     Tobacco Use     Smoking status: Never Smoker     Smokeless tobacco: Never Used   Substance Use Topics     Alcohol use: No     Drug use: No        Medications:    cephALEXin (KEFLEX) 500 MG capsule  acetaminophen (TYLENOL) 325 MG  tablet  albuterol (PROAIR HFA/PROVENTIL HFA/VENTOLIN HFA) 108 (90 Base) MCG/ACT inhaler  ibuprofen (ADVIL/MOTRIN) 600 MG tablet  Prenatal Vit-Fe Fumarate-FA (PRENATAL MULTIVITAMIN W/IRON) 27-0.8 MG tablet  senna-docusate (SENOKOT-S/PERICOLACE) 8.6-50 MG tablet          Review of Systems   All other systems reviewed and are negative.      Physical Exam   BP: 115/72  Heart Rate: 72  Temp: 97.2  F (36.2  C)  Resp: 14  SpO2: 98 %      Physical Exam  Vitals signs and nursing note reviewed. Exam conducted with a chaperone present.   Constitutional:       General: She is not in acute distress.     Appearance: She is not ill-appearing, toxic-appearing or diaphoretic.   Skin:     Comments: Left upper lateral breast in the 1- 3 o'clock position with a 8 cm diameter erythematous area.  No fluctuance or induration.  No sign of abscess.  No other abnormalities on breast inspection.  No axillary adenopathy.  Exam performed with mookie Dasilva present   Neurological:      Mental Status: She is alert.         ED Course        Procedures               Critical Care time:  none               No results found for this or any previous visit (from the past 24 hour(s)).    Medications - No data to display    Assessments & Plan (with Medical Decision Making)  Mastitis     19 year old female currently 3 months post partum nursing female who presents for evaluation of left breast redness, tenderness, and fever starting yesterday afternoon.  She continues to nurse.  Denies ever having this in the past.  On exam she is afebrile with a temperature of 96.7.  She has an erythematous area in the 2 o'clock position of the left upper breast without induration or fluctuance.  Increased warmth to palpation.  No axillary adenopathy.  No purulent drainage from the nipple.  Treatment with Keflex per orders.  Importance of warm compress discussed with the patient in detail.  Safe to continue nursing, and that this is strongly encouraged.  Questions  answered.  Indications for return reviewed.  The patient was in agreement with this plan and was suitable for discharge.     I have reviewed the nursing notes.    I have reviewed the findings, diagnosis, plan and need for follow up with the patient.       Discharge Medication List as of 12/9/2019  5:00 PM      START taking these medications    Details   cephALEXin (KEFLEX) 500 MG capsule Take 1 capsule (500 mg) by mouth 4 times daily for 10 days, Disp-40 capsule, R-0, E-Prescribe             Final diagnoses:   Mastitis     This document serves as a record of services personally performed by Kb Bee PA-C. It was created on their behalf by Vidya Ponce, a trained medical scribe. The creation of this record is based on the provider's personal observations and the statements of the patient. This document has been checked and approved by the attending provider.    Note: Chart documentation done in part with Dragon Voice Recognition software. Although reviewed after completion, some word and grammatical errors may remain.    12/9/2019   Kb Bee PA-C   [facility] EMERGENCY DEPARTMENT     Kb Bee PA-C  12/09/19 5613

## 2019-12-09 NOTE — ED TRIAGE NOTES
Here with left breast pain that started last night. Reports breast is warm, red and swollen. Currently 3 months post partum.

## 2019-12-09 NOTE — DISCHARGE INSTRUCTIONS
It was a pleasure working with you today!  I hope your condition improves rapidly!     Please apply heat to your left breast for 20 minutes every 1 hour this evening and then switch to every 2 hours tomorrow.  This will help your treatment note significantly.  Please start the antibiotic, cephalexin, right away and take this for the full 10 days.  This antibiotic is NOT risky for Scarlet thankfully and you should continue to nurse as usual.

## 2019-12-09 NOTE — TELEPHONE ENCOUNTER
"Patient states she is 3 months post partum ( 19) and \"might have mastitis.\"  Pain started last evening. Reports had a fever of \"101.0\" (Tympanic).  Not able to check temperature now.  Denies chills.    Currently Left breast is more painful than Right breast.  Left breast has a 2 inch red area with slight swelling and hot to touch by report.  Left nipple is sore. No bleeding.  Describes pain as sharp and a \"6\" Left breast.     Describes Right breast pain as \"5\" on a 1-10 pain scale.  No redness Right breast.  Also states has nausea and feeling weak since last night. No vomiting.  Tolerating fluids well.  Voiding as usual.  Behavior as usual by report.  Reports infant is breast feeding as usual. Last wet diaper within last 6-8 hours.    Protocol-  Breastfeeding- Mother's Breast Symptoms- Adult  Care advice reviewed.   Disposition-  See Physician within 3-4 hours.  Caller states understanding of the recommended disposition.   Plans to go now to [facility]. Has a ride.  Advised to call back if further questions or concerns.     KULWINDER Alvarado RN  [facility]     Reason for Disposition    [1] Breast looks infected (spreading redness, feels hot to touch) AND [2] large red area (> 2 in. or 5 cm)    Additional Information    Negative: [1] SEVERE breast pain AND [2] fever > 103 F (39.4 C)    Negative: Mother sounds very sick or weak to the triager    Protocols used: BREASTFEEDING - MOTHER'S BREAST SYMPTOMS OR [id number]    "

## 2020-03-13 ENCOUNTER — MEDICAL CORRESPONDENCE (OUTPATIENT)
Dept: HEALTH INFORMATION MANAGEMENT | Facility: CLINIC | Age: 20
End: 2020-03-13

## 2021-09-18 ENCOUNTER — APPOINTMENT (OUTPATIENT)
Dept: ULTRASOUND IMAGING | Facility: CLINIC | Age: 21
End: 2021-09-18
Attending: FAMILY MEDICINE

## 2021-09-18 ENCOUNTER — HOSPITAL ENCOUNTER (EMERGENCY)
Facility: CLINIC | Age: 21
Discharge: HOME | End: 2021-09-18
Attending: FAMILY MEDICINE | Admitting: FAMILY MEDICINE

## 2021-09-18 VITALS
TEMPERATURE: 98.7 F | DIASTOLIC BLOOD PRESSURE: 72 MMHG | HEIGHT: 67 IN | RESPIRATION RATE: 18 BRPM | WEIGHT: 200 LBS | HEART RATE: 56 BPM | BODY MASS INDEX: 31.39 KG/M2 | SYSTOLIC BLOOD PRESSURE: 120 MMHG | OXYGEN SATURATION: 98 %

## 2021-09-18 DIAGNOSIS — R10.11 RIGHT UPPER QUADRANT ABDOMINAL PAIN: ICD-10-CM

## 2021-09-18 DIAGNOSIS — K80.50 RECURRENT BILIARY COLIC: ICD-10-CM

## 2021-09-18 LAB
ALBUMIN SERPL-MCNC: 4 G/DL (ref 3.4–5)
ALP SERPL-CCNC: 84 U/L (ref 40–150)
ALT SERPL W P-5'-P-CCNC: 23 U/L (ref 0–50)
ANION GAP SERPL CALCULATED.3IONS-SCNC: 5 MMOL/L (ref 3–14)
AST SERPL W P-5'-P-CCNC: 12 U/L (ref 0–45)
BASOPHILS # BLD AUTO: 0.1 10E3/UL (ref 0–0.2)
BASOPHILS NFR BLD AUTO: 1 %
BILIRUB SERPL-MCNC: 0.4 MG/DL (ref 0.2–1.3)
BUN SERPL-MCNC: 8 MG/DL (ref 7–30)
CALCIUM SERPL-MCNC: 9 MG/DL (ref 8.5–10.1)
CHLORIDE BLD-SCNC: 111 MMOL/L (ref 94–109)
CO2 SERPL-SCNC: 25 MMOL/L (ref 20–32)
CREAT SERPL-MCNC: 0.78 MG/DL (ref 0.52–1.04)
CRP SERPL-MCNC: <2.9 MG/L (ref 0–8)
EOSINOPHIL # BLD AUTO: 0.2 10E3/UL (ref 0–0.7)
EOSINOPHIL NFR BLD AUTO: 2 %
ERYTHROCYTE [DISTWIDTH] IN BLOOD BY AUTOMATED COUNT: 12.4 % (ref 10–15)
GFR SERPL CREATININE-BSD FRML MDRD: >90 ML/MIN/1.73M2
GLUCOSE BLD-MCNC: 96 MG/DL (ref 70–99)
HCT VFR BLD AUTO: 37.1 % (ref 35–47)
HGB BLD-MCNC: 13.2 G/DL (ref 11.7–15.7)
IMM GRANULOCYTES # BLD: 0 10E3/UL
IMM GRANULOCYTES NFR BLD: 0 %
LIPASE SERPL-CCNC: 123 U/L (ref 73–393)
LYMPHOCYTES # BLD AUTO: 2.1 10E3/UL (ref 0.8–5.3)
LYMPHOCYTES NFR BLD AUTO: 26 %
MCH RBC QN AUTO: 30.2 PG (ref 26.5–33)
MCHC RBC AUTO-ENTMCNC: 35.6 G/DL (ref 31.5–36.5)
MCV RBC AUTO: 85 FL (ref 78–100)
MONOCYTES # BLD AUTO: 0.6 10E3/UL (ref 0–1.3)
MONOCYTES NFR BLD AUTO: 8 %
NEUTROPHILS # BLD AUTO: 5.1 10E3/UL (ref 1.6–8.3)
NEUTROPHILS NFR BLD AUTO: 63 %
NRBC # BLD AUTO: 0 10E3/UL
NRBC BLD AUTO-RTO: 0 /100
PLATELET # BLD AUTO: 284 10E3/UL (ref 150–450)
POTASSIUM BLD-SCNC: 3.8 MMOL/L (ref 3.4–5.3)
PROT SERPL-MCNC: 7.2 G/DL (ref 6.8–8.8)
RBC # BLD AUTO: 4.37 10E6/UL (ref 3.8–5.2)
SODIUM SERPL-SCNC: 141 MMOL/L (ref 133–144)
WBC # BLD AUTO: 8.1 10E3/UL (ref 4–11)

## 2021-09-18 PROCEDURE — 76705 ECHO EXAM OF ABDOMEN: CPT

## 2021-09-18 PROCEDURE — 36415 COLL VENOUS BLD VENIPUNCTURE: CPT | Performed by: FAMILY MEDICINE

## 2021-09-18 PROCEDURE — 96374 THER/PROPH/DIAG INJ IV PUSH: CPT | Performed by: FAMILY MEDICINE

## 2021-09-18 PROCEDURE — 85025 COMPLETE CBC W/AUTO DIFF WBC: CPT | Performed by: FAMILY MEDICINE

## 2021-09-18 PROCEDURE — 99285 EMERGENCY DEPT VISIT HI MDM: CPT | Mod: 25 | Performed by: FAMILY MEDICINE

## 2021-09-18 PROCEDURE — 80053 COMPREHEN METABOLIC PANEL: CPT | Performed by: FAMILY MEDICINE

## 2021-09-18 PROCEDURE — 86140 C-REACTIVE PROTEIN: CPT | Performed by: FAMILY MEDICINE

## 2021-09-18 PROCEDURE — 250N000011 HC RX IP 250 OP 636: Performed by: FAMILY MEDICINE

## 2021-09-18 PROCEDURE — 96375 TX/PRO/DX INJ NEW DRUG ADDON: CPT | Performed by: FAMILY MEDICINE

## 2021-09-18 PROCEDURE — 99285 EMERGENCY DEPT VISIT HI MDM: CPT | Performed by: FAMILY MEDICINE

## 2021-09-18 PROCEDURE — 83690 ASSAY OF LIPASE: CPT | Performed by: FAMILY MEDICINE

## 2021-09-18 PROCEDURE — 258N000003 HC RX IP 258 OP 636: Performed by: FAMILY MEDICINE

## 2021-09-18 PROCEDURE — 96361 HYDRATE IV INFUSION ADD-ON: CPT | Performed by: FAMILY MEDICINE

## 2021-09-18 RX ORDER — ONDANSETRON 4 MG/1
4 TABLET, ORALLY DISINTEGRATING ORAL EVERY 6 HOURS PRN
Qty: 10 TABLET | Refills: 0 | Status: SHIPPED | OUTPATIENT
Start: 2021-09-18 | End: 2021-09-21

## 2021-09-18 RX ORDER — SODIUM CHLORIDE 9 MG/ML
INJECTION, SOLUTION INTRAVENOUS CONTINUOUS
Status: DISCONTINUED | OUTPATIENT
Start: 2021-09-18 | End: 2021-09-18 | Stop reason: HOSPADM

## 2021-09-18 RX ORDER — ONDANSETRON 2 MG/ML
4 INJECTION INTRAMUSCULAR; INTRAVENOUS ONCE
Status: COMPLETED | OUTPATIENT
Start: 2021-09-18 | End: 2021-09-18

## 2021-09-18 RX ORDER — OXYCODONE HYDROCHLORIDE 5 MG/1
5 TABLET ORAL EVERY 6 HOURS PRN
Qty: 8 TABLET | Refills: 0 | Status: SHIPPED | OUTPATIENT
Start: 2021-09-18 | End: 2022-03-10

## 2021-09-18 RX ORDER — HYDROMORPHONE HYDROCHLORIDE 1 MG/ML
0.5 INJECTION, SOLUTION INTRAMUSCULAR; INTRAVENOUS; SUBCUTANEOUS
Status: DISCONTINUED | OUTPATIENT
Start: 2021-09-18 | End: 2021-09-18 | Stop reason: HOSPADM

## 2021-09-18 RX ADMIN — SODIUM CHLORIDE: 9 INJECTION, SOLUTION INTRAVENOUS at 04:36

## 2021-09-18 RX ADMIN — ONDANSETRON 4 MG: 2 INJECTION INTRAMUSCULAR; INTRAVENOUS at 04:36

## 2021-09-18 RX ADMIN — HYDROMORPHONE HYDROCHLORIDE 0.5 MG: 1 INJECTION, SOLUTION INTRAMUSCULAR; INTRAVENOUS; SUBCUTANEOUS at 06:22

## 2021-09-18 ASSESSMENT — ENCOUNTER SYMPTOMS
ABDOMINAL DISTENTION: 0
NAUSEA: 1
CARDIOVASCULAR NEGATIVE: 1
ABDOMINAL PAIN: 1
RESPIRATORY NEGATIVE: 1
EYES NEGATIVE: 1
POLYPHAGIA: 0
POLYDIPSIA: 0
FEVER: 0
CONSTIPATION: 0
BLOOD IN STOOL: 0
DIARRHEA: 0
CHILLS: 0
VOMITING: 1
PSYCHIATRIC NEGATIVE: 1
MUSCULOSKELETAL NEGATIVE: 1
NEUROLOGICAL NEGATIVE: 1

## 2021-09-18 ASSESSMENT — MIFFLIN-ST. JEOR: SCORE: 1699.82

## 2021-09-18 NOTE — ED TRIAGE NOTES
Abdominal pain RUQ described as sharp, stabbing, radiates into the back. Onset ~0000 waking pt up from sleep. Nausea, 1 episode of vomiting. Has had this pain before but subsided on it's own. Denies any abdominal surgical hx.

## 2021-09-18 NOTE — DISCHARGE INSTRUCTIONS
Please read and follow the handout(s) instructions. Return, if needed, for increased or worsening symptoms and as directed by the handout(s).    You will be scheduled to see the surgeon for consultation about your gallbladder.

## 2021-09-19 NOTE — ED PROVIDER NOTES
History     Chief Complaint   Patient presents with     Abdominal Pain     HPI  Tess Graf is a 21 year old female who presents the emergency room secondary to concerns of right upper quadrant abdominal pain that occurred suddenly at midnight tonight.  Pain awoke her from sleep.  She had instant nausea and 1 episode of vomiting associated with the pain.  Patient states this is the fourth episode she has had for last several months of similar pain episodes.  She denies any bloody emesis.  She denies any changes in her bowel movements or urination.  She has had no vaginal bleeding and denies possibility of pregnancy.  She denied any fall or injury.  I asked what she ate for supper last night and she states that she went out to eat and had artichoke dip and a cheese sauce with fried calamari.  Ask if there is a family history of gallbladder problems in the family and she states both her mother and grandmother had her gallbladder was removed.    Allergies:  Allergies   Allergen Reactions     Nuvaring Nausea       Problem List:    Patient Active Problem List    Diagnosis Date Noted     Postpartum care and examination of lactating mother 08/29/2019     Priority: Medium     Reactive airway disease 08/12/2019     Priority: Medium     Overview:   uses inhaler with hay or nikki work       Exercise-induced asthma 04/15/2019     Priority: Medium     Shoulder pain, right 05/29/2015     Priority: Medium     Pes planus 09/23/2014     Priority: Medium        Past Medical History:    Past Medical History:   Diagnosis Date     Anxiety      Blood type, Rh negative 2/19/2019     Concussion 05/29/2015     Depression        Past Surgical History:    Past Surgical History:   Procedure Laterality Date     NO HISTORY OF SURGERY         Family History:    Family History   Problem Relation Age of Onset     Obesity Mother      Depression Maternal Grandmother      Anxiety Disorder Maternal Grandmother      No Known Problems  "Half-Brother      No Known Problems Half-Brother        Social History:  Marital Status:  Single [1]  Social History     Tobacco Use     Smoking status: Never Smoker     Smokeless tobacco: Never Used   Substance Use Topics     Alcohol use: No     Drug use: No        Medications:    ondansetron (ZOFRAN ODT) 4 MG ODT tab  oxyCODONE (ROXICODONE) 5 MG tablet  acetaminophen (TYLENOL) 325 MG tablet  albuterol (PROAIR HFA/PROVENTIL HFA/VENTOLIN HFA) 108 (90 Base) MCG/ACT inhaler  ibuprofen (ADVIL/MOTRIN) 600 MG tablet  Prenatal Vit-Fe Fumarate-FA (PRENATAL MULTIVITAMIN W/IRON) 27-0.8 MG tablet  senna-docusate (SENOKOT-S/PERICOLACE) 8.6-50 MG tablet          Review of Systems   Constitutional: Negative for chills and fever.   HENT: Negative.    Eyes: Negative.    Respiratory: Negative.    Cardiovascular: Negative.    Gastrointestinal: Positive for abdominal pain (RUQ that wraps around into her mid back area.), nausea and vomiting. Negative for abdominal distention, blood in stool, constipation and diarrhea.   Endocrine: Negative for polydipsia, polyphagia and polyuria.   Genitourinary: Negative.    Musculoskeletal: Negative.    Skin: Negative.  Negative for rash.   Neurological: Negative.    Psychiatric/Behavioral: Negative.    All other systems reviewed and are negative.      Physical Exam   BP: 137/57  Pulse: 64  Temp: 98.7  F (37.1  C)  Resp: 18  Height: 170.2 cm (5' 7\")  Weight: 90.7 kg (200 lb)  SpO2: 97 %      Physical Exam  Vitals and nursing note reviewed. Exam conducted with a chaperone present (Spouse).   Constitutional:       General: She is in acute distress (Mild).      Appearance: She is well-developed.   HENT:      Head: Normocephalic and atraumatic.   Cardiovascular:      Rate and Rhythm: Normal rate.      Heart sounds: Normal heart sounds.   Pulmonary:      Effort: Pulmonary effort is normal.      Breath sounds: Normal breath sounds.   Abdominal:      General: Bowel sounds are decreased. There is " distension.      Palpations: Abdomen is soft.      Tenderness: There is abdominal tenderness in the right upper quadrant. There is guarding. There is no rebound.      Hernia: No hernia is present.   Skin:     Capillary Refill: Capillary refill takes less than 2 seconds.   Neurological:      General: No focal deficit present.      Mental Status: She is alert and oriented to person, place, and time.   Psychiatric:         Mood and Affect: Mood normal.         Behavior: Behavior normal.         ED Course        Procedures              Critical Care time:  none               Results for orders placed or performed during the hospital encounter of 09/18/21 (from the past 24 hour(s))   CBC with platelets differential    Narrative    The following orders were created for panel order CBC with platelets differential.  Procedure                               Abnormality         Status                     ---------                               -----------         ------                     CBC with platelets and d...[[id number]]                      Final result                 Please view results for these tests on the individual orders.   Comprehensive metabolic panel   Result Value Ref Range    Sodium 141 133 - 144 mmol/L    Potassium 3.8 3.4 - 5.3 mmol/L    Chloride 111 (H) 94 - 109 mmol/L    Carbon Dioxide (CO2) 25 20 - 32 mmol/L    Anion Gap 5 3 - 14 mmol/L    Urea Nitrogen 8 7 - 30 mg/dL    Creatinine 0.78 0.52 - 1.04 mg/dL    Calcium 9.0 8.5 - 10.1 mg/dL    Glucose 96 70 - 99 mg/dL    Alkaline Phosphatase 84 40 - 150 U/L    AST 12 0 - 45 U/L    ALT 23 0 - 50 U/L    Protein Total 7.2 6.8 - 8.8 g/dL    Albumin 4.0 3.4 - 5.0 g/dL    Bilirubin Total 0.4 0.2 - 1.3 mg/dL    GFR Estimate >90 >60 mL/min/1.73m2   CBC with platelets and differential   Result Value Ref Range    WBC Count 8.1 4.0 - 11.0 10e3/uL    RBC Count 4.37 3.80 - 5.20 10e6/uL    Hemoglobin 13.2 11.7 - 15.7 g/dL    Hematocrit 37.1 35.0 - 47.0 %    MCV 85 78 -  100 fL    MCH 30.2 26.5 - 33.0 pg    MCHC 35.6 31.5 - 36.5 g/dL    RDW 12.4 10.0 - 15.0 %    Platelet Count 284 150 - 450 10e3/uL    % Neutrophils 63 %    % Lymphocytes 26 %    % Monocytes 8 %    % Eosinophils 2 %    % Basophils 1 %    % Immature Granulocytes 0 %    NRBCs per 100 WBC 0 <1 /100    Absolute Neutrophils 5.1 1.6 - 8.3 10e3/uL    Absolute Lymphocytes 2.1 0.8 - 5.3 10e3/uL    Absolute Monocytes 0.6 0.0 - 1.3 10e3/uL    Absolute Eosinophils 0.2 0.0 - 0.7 10e3/uL    Absolute Basophils 0.1 0.0 - 0.2 10e3/uL    Absolute Immature Granulocytes 0.0 <=0.0 10e3/uL    Absolute NRBCs 0.0 10e3/uL   Lipase   Result Value Ref Range    Lipase 123 73 - 393 U/L   CRP inflammation   Result Value Ref Range    CRP Inflammation <2.9 0.0 - 8.0 mg/L   US Abdomen Limited (RUQ)    Narrative    EXAM: ULTRASOUND ABDOMEN LIMITED - RIGHT UPPER QUADRANT  LOCATION[facility]  DATE/TIME: 09/18/2021, 5:24 AM    INDICATION: Right upper quadrant pain.  COMPARISON: None.    TECHNIQUE: Limited abdominal ultrasound.    FINDINGS:    GALLBLADDER: Sludge is present within a borderline distended gallbladder. No visualized gallstones, gallbladder wall thickening or pericholecystic fluid. No focal tenderness over the gallbladder.    BILE DUCTS: No intrahepatic or extrahepatic biliary dilatation. The common duct measures 0.3 cm in diameter.    LIVER: Normal echogenicity. No visualized masses.    RIGHT KIDNEY: 10.9 cm in length. No intrarenal collecting system dilatation, calculi or masses.     OTHER: No free fluid in the upper right hemiabdomen.      Impression    IMPRESSION:   1.  Sludge is present within a borderline distended gallbladder. The gallbladder is otherwise unremarkable in appearance. No convincing evidence of acute cholecystitis.  2.  No biliary dilatation.           Medications   ondansetron (ZOFRAN) injection 4 mg (4 mg Intravenous Given 9/18/21 0436)       Assessments & Plan (with Medical Decision  Making)  Patient to the ER with recurrent episodes of right upper quadrant sabas pain over last several months with this episode involving an episode of vomiting.  Patient symptoms had improved to the point where she refused offer pain meds during the ER stay but was still nauseated and was treated with Zofran.  She had improvement in her nausea with Zofran.  Exam findings consistent with likely biliary colic as reason for symptomatology.  Ultrasound gallbladder revealed evidence for sludge and a mildly distended gallbladder.  No biliary dilatation identified.  We have elected to discharge patient home with instructions on low-fat diet.  Referral was sent to general surgery for follow-up examination.  Additional medications prescribed as listed below.  Handouts sent home with her discussing gallbladder colic issues.  She is encouraged to return for increase or worsening symptoms as needed.     I have reviewed the nursing notes.    I have reviewed the findings, diagnosis, plan and need for follow up with the patient.       Discharge Medication List as of 9/18/2021  7:05 AM      START taking these medications    Details   ondansetron (ZOFRAN ODT) 4 MG ODT tab Take 1 tablet (4 mg) by mouth every 6 hours as needed for nausea or vomiting, Disp-10 tablet, R-0, E-Prescribe      oxyCODONE (ROXICODONE) 5 MG tablet Take 1 tablet (5 mg) by mouth every 6 hours as needed for pain, Disp-8 tablet, R-0, E-Prescribe                  I verbally discussed the findings of the evaluation today in the ER. I have verbally discussed with Tess the suggested treatment(s) as described in the discharge instructions and handouts. I have prescribed the above listed medications and instructed her on appropriate use of these medications.      I have verbally suggested she follow-up in her clinic or return to the ER for increased symptoms. See the follow-up recommendations documented  in the after visit summary in this visit's EPIC  chart.      Final diagnoses:   Right upper quadrant abdominal pain   Recurrent biliary colic       9/18/2021   [facility] EMERGENCY DEPT     Chon Eisenberg,   09/18/21 2848

## 2021-09-21 ENCOUNTER — OFFICE VISIT (OUTPATIENT)
Dept: SURGERY | Facility: CLINIC | Age: 21
End: 2021-09-21

## 2021-09-21 ENCOUNTER — TELEPHONE (OUTPATIENT)
Dept: SURGERY | Facility: CLINIC | Age: 21
End: 2021-09-21

## 2021-09-21 VITALS
TEMPERATURE: 98.1 F | DIASTOLIC BLOOD PRESSURE: 72 MMHG | HEIGHT: 67 IN | WEIGHT: 200 LBS | SYSTOLIC BLOOD PRESSURE: 124 MMHG | BODY MASS INDEX: 31.39 KG/M2

## 2021-09-21 DIAGNOSIS — K80.50 RECURRENT BILIARY COLIC: Primary | ICD-10-CM

## 2021-09-21 PROCEDURE — 99204 OFFICE O/P NEW MOD 45 MIN: CPT | Performed by: SURGERY

## 2021-09-21 ASSESSMENT — MIFFLIN-ST. JEOR: SCORE: 1699.82

## 2021-09-21 NOTE — TELEPHONE ENCOUNTER
Spoke with patient she doesn't have insurance at this time, she would like to schedule surgery I gave her the Financial services phone number for [facility] [phone].

## 2021-09-21 NOTE — PROGRESS NOTES
Assessment & Plan   Problem List Items Addressed This Visit     None      Visit Diagnoses     Recurrent biliary colic    -  Primary    Relevant Orders    Case Request: Laparoscopic cholecystectomy, possible open    BMI 31.0-31.9,adult             The patient was thoroughly counseled regarding their Recurrent biliary colic [K80.50].     The patient was informed that the proposed procedure or medical intervention involves removal of the gallbladder laparoscopically (with small incisions and camera) possibly open and does offer a very good likelihood of symptom relief.     The patient was made aware of the risks of the procedure, including but not limited to:    bleeding, conversion to open, bile leak, bile duct injury or other adjacent organ injury, retained gallstones, cardiac or pulmonary related complications and anesthesia complications. Also that difficulties may be encountered during recovery to include: wound or deep intraabdominal infection, wound dehiscence, incisional hernia, bile leak or retained stone requiring ERCP.     In the course of the evaluation we did discuss other therapeutic options with the patient, including antibiotics and/or drainage. The risks and benefits of these options were also discussed which include but are not limited to: recurrent worsening episodes.     Also discussed were possible problems or difficulties the patient may encounter if treatment was not pursued at this time. These include: worsening episodes, cholangitis and/or pancreatitis.     The patient was [facility] will be primarily responsible for the procedure. Assistance during the procedure and during hospitalization may also be provided by other physicians, nurses and technicians.     The patient was also informed that if exposure to the patient s blood or body fluids occurs during the procedure, HIV testing of the patient will occur unless they refuse at this time. Risk of blood transfusion is  "minimal.     The patient will be provided additional education resources by the support staff. If there are ever any questions regarding their diagnosis or the procedure, the patient is encouraged to ask.     All of the patient s or their legal representative s questions have been answered to their satisfaction and they have indicated a clear understanding of this discussion.   Tess expressed understanding of risks, benefits and alternatives and wished to proceed.     All findings, test results, and diagnosis were discussed with the patient. Tess  participated in the decision making process and agreed with the plan of care. Questions were sought and answered.        BMI:   Estimated body mass index is 31.32 kg/m  as calculated from the following:    Height as of this encounter: 1.702 m (5' 7\").    Weight as of this encounter: 90.7 kg (200 lb).       No follow-ups on file.    [facility]    Medina Pulido is a 21 year old who presents for the following health issues:    RUQ pain for the past year.  The RUQ pain attack has increased in frequency and pain much more intense.  Attack seem to be in the middle of the night. The last attack happened after a cheesy and fatty dinner.  +nausea and vomiting.  Pain usually last about 6 hrs.  Patient usually \"suffers\" through it.  The past few months, been having at least 2-3 times a month.  No diarrhea; no black stool.  No ulcer in stomach.  Never had a h pylori infection.  Never been a smoker.  Never had any abdominal surgery.   No fevers; no chills.       Patient is getting  this Saturday.           Review of Systems   Constitutional, HEENT, cardiovascular, pulmonary, GI, , musculoskeletal, neuro, skin, endocrine and psych systems are negative, except as otherwise noted.      Objective    /72   Temp 98.1  F (36.7  C) (Temporal)   Ht 1.702 m (5' 7\")   Wt 90.7 kg (200 lb)   LMP 09/05/2021   BMI 31.32 kg/m  "   Body mass index is 31.32 kg/m .  Physical Exam  Vitals reviewed.   Eyes:      Conjunctiva/sclera: Conjunctivae normal.   Cardiovascular:      Pulses: Normal pulses.   Abdominal:      General: There is no distension.      Palpations: Abdomen is soft.      Tenderness: There is no abdominal tenderness. There is no guarding.      Hernia: No hernia is present.   Musculoskeletal:         General: Normal range of motion.      Cervical back: Normal range of motion.   Skin:     General: Skin is warm.      Capillary Refill: Capillary refill takes less than 2 seconds.   Neurological:      General: No focal deficit present.      Mental Status: She is alert.   Psychiatric:         Mood and Affect: Mood normal.        EXAM: ULTRASOUND ABDOMEN LIMITED - RIGHT UPPER QUADRANT  LOCATION: [facility]  DATE/TIME: 09/18/2021, 5:24 AM     INDICATION: Right upper quadrant pain.  COMPARISON: None.     TECHNIQUE: Limited abdominal ultrasound.     FINDINGS:     GALLBLADDER: Sludge is present within a borderline distended gallbladder. No visualized gallstones, gallbladder wall thickening or pericholecystic fluid. No focal tenderness over the gallbladder.     BILE DUCTS: No intrahepatic or extrahepatic biliary dilatation. The common duct measures 0.3 cm in diameter.     LIVER: Normal echogenicity. No visualized masses.     RIGHT KIDNEY: 10.9 cm in length. No intrarenal collecting system dilatation, calculi or masses.      OTHER: No free fluid in the upper right hemiabdomen.                                                                      IMPRESSION:   1.  Sludge is present within a borderline distended gallbladder. The gallbladder is otherwise unremarkable in appearance. No convincing evidence of acute cholecystitis.  2.  No biliary dilatation.

## 2021-09-21 NOTE — LETTER
9/21/2021         RE: Tess Graf  [id number]  [facility] [address]        Dear Colleague,    Thank you for referring your patient, Tess Graf, to the [facility]. Please see a copy of my visit note below.        Assessment & Plan   Problem List Items Addressed This Visit     None      Visit Diagnoses     Recurrent biliary colic    -  Primary    Relevant Orders    Case Request: Laparoscopic cholecystectomy, possible open    BMI 31.0-31.9,adult             The patient was thoroughly counseled regarding their Recurrent biliary colic [K80.50].     The patient was informed that the proposed procedure or medical intervention involves removal of the gallbladder laparoscopically (with small incisions and camera) possibly open and does offer a very good likelihood of symptom relief.     The patient was made aware of the risks of the procedure, including but not limited to:    bleeding, conversion to open, bile leak, bile duct injury or other adjacent organ injury, retained gallstones, cardiac or pulmonary related complications and anesthesia complications. Also that difficulties may be encountered during recovery to include: wound or deep intraabdominal infection, wound dehiscence, incisional hernia, bile leak or retained stone requiring ERCP.     In the course of the evaluation we did discuss other therapeutic options with the patient, including antibiotics and/or drainage. The risks and benefits of these options were also discussed which include but are not limited to: recurrent worsening episodes.     Also discussed were possible problems or difficulties the patient may encounter if treatment was not pursued at this time. These include: worsening episodes, cholangitis and/or pancreatitis.     The patient was informed that [facility], DO will be primarily responsible for the procedure. Assistance during the procedure and during hospitalization may also be provided by other  "physicians, nurses and technicians.     The patient was also informed that if exposure to the patient s blood or body fluids occurs during the procedure, HIV testing of the patient will occur unless they refuse at this time. Risk of blood transfusion is minimal.     The patient will be provided additional education resources by the support staff. If there are ever any questions regarding their diagnosis or the procedure, the patient is encouraged to ask.     All of the patient s or their legal representative s questions have been answered to their satisfaction and they have indicated a clear understanding of this discussion.   Tess expressed understanding of risks, benefits and alternatives and wished to proceed.     All findings, test results, and diagnosis were discussed with the patient. Tess  participated in the decision making process and agreed with the plan of care. Questions were sought and answered.        BMI:   Estimated body mass index is 31.32 kg/m  as calculated from the following:    Height as of this encounter: 1.702 m (5' 7\").    Weight as of this encounter: 90.7 kg (200 lb).       No follow-ups on file.    YuriNina Tracy MD  [facility]    Medina Pulido is a 21 year old who presents for the following health issues:    RUQ pain for the past year.  The RUQ pain attack has increased in frequency and pain much more intense.  Attack seem to be in the middle of the night. The last attack happened after a cheesy and fatty dinner.  +nausea and vomiting.  Pain usually last about 6 hrs.  Patient usually \"suffers\" through it.  The past few months, been having at least 2-3 times a month.  No diarrhea; no black stool.  No ulcer in stomach.  Never had a h pylori infection.  Never been a smoker.  Never had any abdominal surgery.   No fevers; no chills.       Patient is getting  this Saturday.           Review of Systems   Constitutional, HEENT, cardiovascular, pulmonary, " "GI, , musculoskeletal, neuro, skin, endocrine and psych systems are negative, except as otherwise noted.      Objective    /72   Temp 98.1  F (36.7  C) (Temporal)   Ht 1.702 m (5' 7\")   Wt 90.7 kg (200 lb)   LMP 09/05/2021   BMI 31.32 kg/m    Body mass index is 31.32 kg/m .  Physical Exam  Vitals reviewed.   Eyes:      Conjunctiva/sclera: Conjunctivae normal.   Cardiovascular:      Pulses: Normal pulses.   Abdominal:      General: There is no distension.      Palpations: Abdomen is soft.      Tenderness: There is no abdominal tenderness. There is no guarding.      Hernia: No hernia is present.   Musculoskeletal:         General: Normal range of motion.      Cervical back: Normal range of motion.   Skin:     General: Skin is warm.      Capillary Refill: Capillary refill takes less than 2 seconds.   Neurological:      General: No focal deficit present.      Mental Status: She is alert.   Psychiatric:         Mood and Affect: Mood normal.        EXAM: ULTRASOUND ABDOMEN LIMITED - RIGHT UPPER QUADRANT  LOCATION: [facility]  DATE/TIME: 09/18/2021, 5:24 AM     INDICATION: Right upper quadrant pain.  COMPARISON: None.     TECHNIQUE: Limited abdominal ultrasound.     FINDINGS:     GALLBLADDER: Sludge is present within a borderline distended gallbladder. No visualized gallstones, gallbladder wall thickening or pericholecystic fluid. No focal tenderness over the gallbladder.     BILE DUCTS: No intrahepatic or extrahepatic biliary dilatation. The common duct measures 0.3 cm in diameter.     LIVER: Normal echogenicity. No visualized masses.     RIGHT KIDNEY: 10.9 cm in length. No intrarenal collecting system dilatation, calculi or masses.      OTHER: No free fluid in the upper right hemiabdomen.                                                                      IMPRESSION:   1.  Sludge is present within a borderline distended gallbladder. The gallbladder is otherwise unremarkable " in appearance. No convincing evidence of acute cholecystitis.  2.  No biliary dilatation.            Again, thank you for allowing me to participate in the care of your patient.        Sincerely,        Onur Tracy MD

## 2021-09-28 NOTE — TELEPHONE ENCOUNTER
Spoke to patient, states she is not ready to schedule. She has not got any answers yet regarding insurance.  She was given my name and # to call when ready.

## 2022-03-09 ENCOUNTER — NURSE TRIAGE (OUTPATIENT)
Dept: NURSING | Facility: CLINIC | Age: 22
End: 2022-03-09

## 2022-03-09 NOTE — TELEPHONE ENCOUNTER
"\"I have had a bladder infection for 2 1/2 weeks.\"    Reporting she has been taking cranberry pills, with no improvement.    Urinary frequency, urgency, and dysuria.     Afebrile.     Denies back or abdominal pain.    Reporting \" a little bit of yellow discharge.\" Pink tinge on toilet paper with wiping.    Disposition to see provider with in 24 hours.    Transferred to Central Scheduling.     Caller verbalized understanding. Denies further questions.      Suzanne Garay RN  [facility]      COVID 19 Nurse Triage Plan/Patient Instructions    Please be aware that novel coronavirus (COVID-19) may be circulating in the community. If you develop symptoms such as fever, cough, or SOB or if you have concerns about the presence of another infection including coronavirus (COVID-19), please contact your health care provider or visit [email or web address].     Disposition/Instructions    In-Person Visit with provider recommended. Reference Visit Selection Guide.    Thank you for taking steps to prevent the spread of this virus.  o Limit your contact with others.  o Wear a simple mask to cover your cough.  o Wash your hands well and often.    Resources    [facility]: About COVID-19: [email or web address]    CDC: What to Do If You're Sick: www.cdc.gov/coronavirus/2019-ncov/about/steps-when-sick.html    CDC: Ending Home Isolation: www.cdc.gov/coronavirus/2019-ncov/hcp/disposition-in-home-patients.html     CDC: Caring for Someone: www.cdc.gov/coronavirus/2019-ncov/if-you-are-sick/care-for-someone.html     [facility]: Interim Guidance for Hospital Discharge to Home: [email or web address]/coronavirus/hcp/hospdischarge.pdf    [facility] clinical trials (COVID-19 research studies): [email or web address]     Below are the COVID-19 hotlines at the [place] Department of [facility] ([facility]). Interpreters are available.   o For health questions: Call 235-381-7871 or " 1-966.148.7291 (7 a.m. to 7 p.m.)  o For questions about schools and childcare: Call 377-747-4467 or 1-738.656.8625 (7 a.m. to 7 p.m.)                       Reason for Disposition    Unusual vaginal discharge (e.g., bad smelling, yellow, green, or foamy-white)    Additional Information    Negative: Shock suspected (e.g., cold/pale/clammy skin, too weak to stand, low BP, rapid pulse)    Negative: Sounds like a life-threatening emergency to the triager    Negative: Followed a genital area injury    Negative: Taking antibiotic for urinary tract infection (UTI)    Negative: Pregnant    Negative: Postpartum (from 0 to 6 weeks after delivery)    Negative: [1] Unable to urinate (or only a few drops) > 4 hours AND [2] bladder feels very full (e.g., palpable bladder or strong urge to urinate)    Negative: Vomiting    Negative: Patient sounds very sick or weak to the triager    Negative: [1] SEVERE pain with urination  (e.g., excruciating) AND [2] not improved after 2 hours of pain medicine and Sitz bath    Negative: Fever > 100.4 F (38.0 C)    Negative: Side (flank) or lower back pain present    Negative: Diabetes mellitus or weak immune system (e.g., HIV positive, cancer chemo, splenectomy, organ transplant, chronic steroids)    Negative: Bedridden (e.g., nursing home patient, CVA, chronic illness, recovering from surgery)    Negative: Artificial heart valve or artificial joint    Protocols used: URINATION PAIN - FEMALE-A-

## 2022-03-10 ENCOUNTER — OFFICE VISIT (OUTPATIENT)
Dept: FAMILY MEDICINE | Facility: CLINIC | Age: 22
End: 2022-03-10

## 2022-03-10 VITALS
SYSTOLIC BLOOD PRESSURE: 102 MMHG | BODY MASS INDEX: 31.26 KG/M2 | TEMPERATURE: 97.9 F | HEIGHT: 67 IN | DIASTOLIC BLOOD PRESSURE: 68 MMHG | WEIGHT: 199.19 LBS | OXYGEN SATURATION: 97 % | HEART RATE: 80 BPM

## 2022-03-10 DIAGNOSIS — N30.01 ACUTE CYSTITIS WITH HEMATURIA: ICD-10-CM

## 2022-03-10 DIAGNOSIS — R39.15 URINARY URGENCY: Primary | ICD-10-CM

## 2022-03-10 LAB
ALBUMIN UR-MCNC: NEGATIVE MG/DL
APPEARANCE UR: CLEAR
BACTERIA #/AREA URNS HPF: ABNORMAL /HPF
BILIRUB UR QL STRIP: NEGATIVE
CLUE CELLS: ABNORMAL
COLOR UR AUTO: ABNORMAL
GLUCOSE UR STRIP-MCNC: NEGATIVE MG/DL
HGB UR QL STRIP: NEGATIVE
KETONES UR STRIP-MCNC: NEGATIVE MG/DL
LEUKOCYTE ESTERASE UR QL STRIP: ABNORMAL
MUCOUS THREADS #/AREA URNS LPF: PRESENT /LPF
NITRATE UR QL: NEGATIVE
PH UR STRIP: 6 [PH] (ref 5–7)
RBC URINE: 5 /HPF
SP GR UR STRIP: 1.01 (ref 1–1.03)
SQUAMOUS EPITHELIAL: 4 /HPF
TRICHOMONAS, WET PREP: ABNORMAL
UROBILINOGEN UR STRIP-MCNC: NORMAL MG/DL
WBC URINE: 22 /HPF
WBC'S/HIGH POWER FIELD, WET PREP: ABNORMAL
YEAST, WET PREP: ABNORMAL

## 2022-03-10 PROCEDURE — 99214 OFFICE O/P EST MOD 30 MIN: CPT | Performed by: FAMILY MEDICINE

## 2022-03-10 PROCEDURE — 87086 URINE CULTURE/COLONY COUNT: CPT | Performed by: FAMILY MEDICINE

## 2022-03-10 PROCEDURE — 87210 SMEAR WET MOUNT SALINE/INK: CPT | Performed by: FAMILY MEDICINE

## 2022-03-10 PROCEDURE — 81001 URINALYSIS AUTO W/SCOPE: CPT | Performed by: FAMILY MEDICINE

## 2022-03-10 RX ORDER — SULFAMETHOXAZOLE/TRIMETHOPRIM 800-160 MG
1 TABLET ORAL 2 TIMES DAILY
Qty: 14 TABLET | Refills: 0 | Status: SHIPPED | OUTPATIENT
Start: 2022-03-10 | End: 2022-03-17

## 2022-03-10 ASSESSMENT — PAIN SCALES - GENERAL: PAINLEVEL: NO PAIN (0)

## 2022-03-10 NOTE — PATIENT INSTRUCTIONS
Patient Education     When to Use Antibiotics    Antibiotics are medicines used to treat infections caused by bacteria. They don t work for an illness caused by a virus. And they don't work for an allergic reaction. In fact, taking antibiotics for reasons other than an infection by bacteria can cause problems. You may have side effects from the medicine. And if you need an antibiotic in the future, it may not work well. This is because the bacteria can become immune to the medicine. You can also get a type of diarrhea that's hard to treat. This diarrhea is called C. diff.   When antibiotics likely won t help  Your healthcare provider won t usually give you antibiotics for the conditions listed below. You can help by not asking for them if you have:     A cold. This type of illness is caused by a virus. It can cause a runny nose, stuffed-up nose, sneezing, coughing, and headache. You may also have mild body aches and low fever. A cold gets better on its own in a few days to a week.    The flu (influenza). This is a respiratory illness caused by a virus. The flu usually goes away on its own in a week or so. It can cause fever, body aches, sore throat, and tiredness.    Bronchitis. This is an infection in the lungs. It is most often caused by a virus. You may have coughing, phlegm, body aches, and a low fever. A common type of bronchitis is known as a chest cold. This is called acute bronchitis. This often happens after you have a respiratory infection like a cold. Bronchitis can take weeks to go away. Antibiotics often don t help.    Most sore throats. Sore throats are most often caused by viruses. Your throat may feel scratchy or achy. It may hurt to swallow. You may also have a low fever and body aches. A sore throat usually gets better in a few days.    Most outer ear infections. An ear infection may be caused by a virus or bacteria. It causes pain in the ear. Antibiotics by mouth usually don t help. Low-dose  antibiotic ear drops work much better.    Some inner ear infections. An inner ear infection (otitis media) can be caused by a virus in the ear. It can also cause pain and a high fever. Most older children with low-grade fever don't need to be treated with antibiotics.    Most sinus infections. This is also known as sinusitis. This kind of infection causes sinus pain and swelling, and a runny nose. In most cases, it goes away on its own. Antibiotics don t make recovery quicker.    Allergic rhinitis. This is a set of symptoms caused by an allergic reaction. You may have sneezing, a runny nose, itchy or watery eyes, or a sore throat. Allergies are not treated with antibiotics.    Low fever. A mild fever that s less than 100.4 F (38 C) most likely doesn t need to be treated with antibiotics.   When antibiotics can help  Antibiotics can be used to treat:                                                       Strep throat. This is a throat infection caused by a certain type of bacteria. Symptoms of strep throat include a sore throat, white patches on the tonsils, red spots on the roof of the mouth, fever, body aches, and nausea and vomiting. Strep throat almost never causes a cough.    Urinary tract infection (UTI). This is an infection of the bladder and the tube that takes urine out of the body. It is caused by bacteria. It can cause burning pain and urine that s cloudy or tinted with blood. UTIs are very common. Antibiotics usually help treat them.    Some outer ear infections. In some cases, a healthcare provider may prescribe antibiotics by mouth for an ear infection. You may need a test to show the cause of the ear infection.    Some sinus infections. In some cases, your healthcare provider may give you antibiotics. He or she may first need to make sure your symptoms aren t caused by something else. This may be a virus, fungus, allergies, or air pollutants such as smoke.   Your healthcare provider may give you  antibiotics if you have a condition that can affect your immune system. This includes diabetes or cancer.  Self-care at home  If your infection can t be treated with antibiotics, you can take other steps to feel better. Try the remedies below. In general:     Rest and sleep as much as needed.    Drink water and other clear fluids.    Don t smoke. Stay away from smoke from other people.    Use over-the-counter medicine such as acetaminophen or ibuprofen to ease pain or fever, as directed by your healthcare provider.  To treat sinus pain or nasal stuffiness:    Put a warm, moist cloth on your face where you feel sinus pain or pressure.    Try a nasal spray with medicine or saline. Use as directed by your healthcare provider.    Breathe in steam from a hot shower.    Use a humidifier or cool mist vaporizer.   To quiet a cough:     Use a humidifier or cool mist vaporizer.    Breathe in steam from a hot shower.    Suck on cough lozenges.   To sooth a sore throat:     Suck on ice chips, frozen ice pops, or lozenges.    Use a sore throat spray.    Use a humidifier or cool mist vaporizer.    Gargle with saltwater.    Drink warm liquids.    Take ibuprofen to reduce swelling and pain.  To ease ear pain:     Hold a warm, moist washcloth on the ear for 10 minutes at a time.  [employer] last reviewed this educational content on 12/1/2019 2000-2021 [employer]. All rights reserved. This information is not intended as a substitute for professional medical care. Always follow your healthcare professional's instructions.           Patient Education     Bladder Infection, Female (Adult)     Urine normally doesn't have any germs (bacteria) in it. But bacteria can get into the urinary tract from the skin around the rectum. Or they can travel in the blood from other parts of the body. Once they are in your urinary tract, they can cause infection in these areas:    The urethra (urethritis)    The bladder (cystitis)    The  kidneys (pyelonephritis)  The most common place for an infection is in the bladder. This is called a bladder infection. This is one of the most common infections in women. Most bladder infections are easily treated. They are not serious unless the infection spreads to the kidney.  The terms bladder infection, UTI, and cystitis are often used to describe the same thing. But they are not always the same. Cystitis is an inflammation of the bladder. The most common cause of cystitis is an infection.  Symptoms  The infection causes inflammation in the urethra and bladder. This causes many of the symptoms. The most common symptoms of a bladder infection are:    Pain or burning when urinating    Having to urinate more often than normal    Urgent need to urinate    Only a small amount of urine comes out    Blood in urine    Belly (abdominal) discomfort. This is often in the lower belly above the pubic bone.    Cloudy urine    Strong- or bad-smelling urine    Unable to urinate (urinary retention)    Unable to hold urine in (urinary incontinence)    Fever    Loss of appetite    Confusion (in older adults)  Causes  Bladder infections are not contagious. You can't get one from someone else, from a toilet seat, or from sharing a bath.  The most common cause of bladder infections is bacteria from the bowels. The bacteria get onto the skin around the opening of the urethra. From there, they can get into the urine. Then they travel up to the bladder, causing inflammation and infection. This often happens because of:    Wiping incorrectly after urinating. Always wipe from front to back.    Bowel incontinence    Pregnancy    Procedures such as having a catheter put in    Older age    Not emptying your bladder. This can give bacteria a chance to grow in your urine.    Fluid loss (dehydration)    Constipation    Having sex    Using a diaphragm for birth control   Treatment  Bladder infections are diagnosed by a urine test and urine  culture. They are treated with antibiotics. They often clear up quickly without problems. Treatment helps prevent a more serious kidney infection.  Medicines  Medicines can help in the treatment of a bladder infection:    Take antibiotics until they are used up, even if you feel better. It's important to finish them to make sure the infection has cleared.    You can use acetaminophen or ibuprofen for pain, fever, or discomfort, unless another medicine was prescribed. If you have long-term (chronic) liver or kidney disease, talk with your healthcare provider before using these medicines. Also talk with your provider if you've ever had a stomach ulcer or GI (gastrointestinal) bleeding, or are taking blood-thinner medicines.    If you are given phenazopydridine to reduce burning with urination, it will make your urine a bright orange color. This can stain clothing.  Care and prevention  These self-care steps can help prevent future infections:    Drink plenty of fluids. This helps to prevent dehydration and flush out your bladder. Do this unless you must restrict fluids for other health reasons, or your healthcare provider told you not to.    Clean yourself correctly after going to the bathroom. Wipe from front to back after using the toilet. This helps prevent the spread of bacteria.    Urinate more often. Don't try to hold urine in for a long time.    Wear loose-fitting clothes and cotton underwear. Don't wear tight-fitting pants.    Improve your diet and prevent constipation. Eat more fresh fruits and vegetables, and fiber. Eat less junk foods and fatty foods.    Don't have sex until your symptoms are gone.    Don't have caffeine, alcohol, and spicy foods. These can irritate your bladder.    Urinate right after you have sex to flush out your bladder.    If you use birth control pills and have frequent bladder infections, discuss it with your healthcare provider.  Follow-up care  Call your healthcare provider if all  symptoms are not gone after 3 days of treatment. This is especially important if you have repeat infections.  If a culture was done, you will be told if your treatment needs to be changed. If directed, you can call to find out the results.  If X-rays were done, you will be told if the results will affect your treatment.  Call 911  Call 911 if any of the following occur:    Trouble breathing    Hard to wake up or confusion    Fainting (loss of consciousness)    Fast heart rate  When to get medical advice  Call your healthcare provider right away if any of these occur:    Fever of 100.4 F (38.0 C) or higher, or as directed by your healthcare provider    Symptoms are not better after 3 days of treatment    Back or belly pain that gets worse    Repeated vomiting, or unable to keep medicine down    Weakness or dizziness    Vaginal discharge    Pain, redness, or swelling in the outer vaginal area (labia)  [employer] last reviewed this educational content on 11/1/2019 2000-2021 [employer]. All rights reserved. This information is not intended as a substitute for professional medical care. Always follow your healthcare professional's instructions.           Patient Education     Understanding Urinary Tract Infections (UTIs)   Most UTIs are caused by bacteria, but they may also be caused by viruses or fungi. Bacteria from the bowel are the most common source of infection. The infection may start because of any of the following:     Sexual activity.  During sex, bacteria can travel from the penis, vagina, or rectum into the urethra.     Bacteria outside the rectum getting into the urethra.  Bacteria on the skin outside the rectum may travel into the urethra. This is more common in women since the rectum and urethra are closer to each other than in men. Wiping from front to back after using the toilet and keeping the area clean can help prevent germs from getting to the urethra.    Blocked urine flow through  the urinary tract. If urine sits too long, germs may start to grow out of control.  Parts of the urinary tract  The infection can occur in any part of the urinary tract.       The kidneys. These organs collect and store urine.    The ureters. These tubes carry urine from the kidneys to the bladder.    The bladder. This holds urine until you are ready to let it out.    The urethra. This tube carries urine from the bladder out of the body. It is shorter in women, so bacteria can move through it more easily. The urethra is longer in men, so a UTI is less likely to reach the bladder or kidneys in men.  [employer] last reviewed this educational content on 1/1/2020 2000-2021 [employer]. All rights reserved. This information is not intended as a substitute for professional medical care. Always follow your healthcare professional's instructions.

## 2022-03-10 NOTE — PROGRESS NOTES
"  Assessment & Plan     Urinary urgency    - UA Macro with Reflex to Micro and Culture - lab collect; Future  - Wet prep - lab collect; Future  - UA Macro with Reflex to Micro and Culture - lab collect  - Wet prep - lab collect  - Urine Culture    Acute cystitis with hematuria  Patient was instructed to drink plenty of fluids, urinate frequently and to contact the office promptly should she notice fever greater than 102, increase in discomfort, skin rash, lack of improvement after 2 days of treatment, or the appearance of new symptoms.    - sulfamethoxazole-trimethoprim (BACTRIM DS) 800-160 MG tablet; Take 1 tablet by mouth 2 times daily for 7 days    Ordering of each unique test  Prescription drug management         BMI:   Estimated body mass index is 31.2 kg/m  as calculated from the following:    Height as of this encounter: 1.702 m (5' 7\").    Weight as of this encounter: 90.4 kg (199 lb 3 oz).   Weight management plan: Patient was referred to their PCP to discuss a diet and exercise plan.    Patient Instructions     Patient Education     When to Use Antibiotics    Antibiotics are medicines used to treat infections caused by bacteria. They don t work for an illness caused by a virus. And they don't work for an allergic reaction. In fact, taking antibiotics for reasons other than an infection by bacteria can cause problems. You may have side effects from the medicine. And if you need an antibiotic in the future, it may not work well. This is because the bacteria can become immune to the medicine. You can also get a type of diarrhea that's hard to treat. This diarrhea is called C. diff.   When antibiotics likely won t help  Your healthcare provider won t usually give you antibiotics for the conditions listed below. You can help by not asking for them if you have:     A cold. This type of illness is caused by a virus. It can cause a runny nose, stuffed-up nose, sneezing, coughing, and headache. You may also have " mild body aches and low fever. A cold gets better on its own in a few days to a week.    The flu (influenza). This is a respiratory illness caused by a virus. The flu usually goes away on its own in a week or so. It can cause fever, body aches, sore throat, and tiredness.    Bronchitis. This is an infection in the lungs. It is most often caused by a virus. You may have coughing, phlegm, body aches, and a low fever. A common type of bronchitis is known as a chest cold. This is called acute bronchitis. This often happens after you have a respiratory infection like a cold. Bronchitis can take weeks to go away. Antibiotics often don t help.    Most sore throats. Sore throats are most often caused by viruses. Your throat may feel scratchy or achy. It may hurt to swallow. You may also have a low fever and body aches. A sore throat usually gets better in a few days.    Most outer ear infections. An ear infection may be caused by a virus or bacteria. It causes pain in the ear. Antibiotics by mouth usually don t help. Low-dose antibiotic ear drops work much better.    Some inner ear infections. An inner ear infection (otitis media) can be caused by a virus in the ear. It can also cause pain and a high fever. Most older children with low-grade fever don't need to be treated with antibiotics.    Most sinus infections. This is also known as sinusitis. This kind of infection causes sinus pain and swelling, and a runny nose. In most cases, it goes away on its own. Antibiotics don t make recovery quicker.    Allergic rhinitis. This is a set of symptoms caused by an allergic reaction. You may have sneezing, a runny nose, itchy or watery eyes, or a sore throat. Allergies are not treated with antibiotics.    Low fever. A mild fever that s less than 100.4 F (38 C) most likely doesn t need to be treated with antibiotics.   When antibiotics can help  Antibiotics can be used to  treat:                                                       Strep throat. This is a throat infection caused by a certain type of bacteria. Symptoms of strep throat include a sore throat, white patches on the tonsils, red spots on the roof of the mouth, fever, body aches, and nausea and vomiting. Strep throat almost never causes a cough.    Urinary tract infection (UTI). This is an infection of the bladder and the tube that takes urine out of the body. It is caused by bacteria. It can cause burning pain and urine that s cloudy or tinted with blood. UTIs are very common. Antibiotics usually help treat them.    Some outer ear infections. In some cases, a healthcare provider may prescribe antibiotics by mouth for an ear infection. You may need a test to show the cause of the ear infection.    Some sinus infections. In some cases, your healthcare provider may give you antibiotics. He or she may first need to make sure your symptoms aren t caused by something else. This may be a virus, fungus, allergies, or air pollutants such as smoke.   Your healthcare provider may give you antibiotics if you have a condition that can affect your immune system. This includes diabetes or cancer.  Self-care at home  If your infection can t be treated with antibiotics, you can take other steps to feel better. Try the remedies below. In general:     Rest and sleep as much as needed.    Drink water and other clear fluids.    Don t smoke. Stay away from smoke from other people.    Use over-the-counter medicine such as acetaminophen or ibuprofen to ease pain or fever, as directed by your healthcare provider.  To treat sinus pain or nasal stuffiness:    Put a warm, moist cloth on your face where you feel sinus pain or pressure.    Try a nasal spray with medicine or saline. Use as directed by your healthcare provider.    Breathe in steam from a hot shower.    Use a humidifier or cool mist vaporizer.   To quiet a cough:     Use a humidifier or  cool mist vaporizer.    Breathe in steam from a hot shower.    Suck on cough lozenges.   To sooth a sore throat:     Suck on ice chips, frozen ice pops, or lozenges.    Use a sore throat spray.    Use a humidifier or cool mist vaporizer.    Gargle with saltwater.    Drink warm liquids.    Take ibuprofen to reduce swelling and pain.  To ease ear pain:     Hold a warm, moist washcloth on the ear for 10 minutes at a time.  [employer] last reviewed this educational content on 12/1/2019 2000-2021 [employer]. All rights reserved. This information is not intended as a substitute for professional medical care. Always follow your healthcare professional's instructions.           Patient Education     Bladder Infection, Female (Adult)     Urine normally doesn't have any germs (bacteria) in it. But bacteria can get into the urinary tract from the skin around the rectum. Or they can travel in the blood from other parts of the body. Once they are in your urinary tract, they can cause infection in these areas:    The urethra (urethritis)    The bladder (cystitis)    The kidneys (pyelonephritis)  The most common place for an infection is in the bladder. This is called a bladder infection. This is one of the most common infections in women. Most bladder infections are easily treated. They are not serious unless the infection spreads to the kidney.  The terms bladder infection, UTI, and cystitis are often used to describe the same thing. But they are not always the same. Cystitis is an inflammation of the bladder. The most common cause of cystitis is an infection.  Symptoms  The infection causes inflammation in the urethra and bladder. This causes many of the symptoms. The most common symptoms of a bladder infection are:    Pain or burning when urinating    Having to urinate more often than normal    Urgent need to urinate    Only a small amount of urine comes out    Blood in urine    Belly (abdominal) discomfort.  This is often in the lower belly above the pubic bone.    Cloudy urine    Strong- or bad-smelling urine    Unable to urinate (urinary retention)    Unable to hold urine in (urinary incontinence)    Fever    Loss of appetite    Confusion (in older adults)  Causes  Bladder infections are not contagious. You can't get one from someone else, from a toilet seat, or from sharing a bath.  The most common cause of bladder infections is bacteria from the bowels. The bacteria get onto the skin around the opening of the urethra. From there, they can get into the urine. Then they travel up to the bladder, causing inflammation and infection. This often happens because of:    Wiping incorrectly after urinating. Always wipe from front to back.    Bowel incontinence    Pregnancy    Procedures such as having a catheter put in    Older age    Not emptying your bladder. This can give bacteria a chance to grow in your urine.    Fluid loss (dehydration)    Constipation    Having sex    Using a diaphragm for birth control   Treatment  Bladder infections are diagnosed by a urine test and urine culture. They are treated with antibiotics. They often clear up quickly without problems. Treatment helps prevent a more serious kidney infection.  Medicines  Medicines can help in the treatment of a bladder infection:    Take antibiotics until they are used up, even if you feel better. It's important to finish them to make sure the infection has cleared.    You can use acetaminophen or ibuprofen for pain, fever, or discomfort, unless another medicine was prescribed. If you have long-term (chronic) liver or kidney disease, talk with your healthcare provider before using these medicines. Also talk with your provider if you've ever had a stomach ulcer or GI (gastrointestinal) bleeding, or are taking blood-thinner medicines.    If you are given phenazopydridine to reduce burning with urination, it will make your urine a bright orange color. This can  stain clothing.  Care and prevention  These self-care steps can help prevent future infections:    Drink plenty of fluids. This helps to prevent dehydration and flush out your bladder. Do this unless you must restrict fluids for other health reasons, or your healthcare provider told you not to.    Clean yourself correctly after going to the bathroom. Wipe from front to back after using the toilet. This helps prevent the spread of bacteria.    Urinate more often. Don't try to hold urine in for a long time.    Wear loose-fitting clothes and cotton underwear. Don't wear tight-fitting pants.    Improve your diet and prevent constipation. Eat more fresh fruits and vegetables, and fiber. Eat less junk foods and fatty foods.    Don't have sex until your symptoms are gone.    Don't have caffeine, alcohol, and spicy foods. These can irritate your bladder.    Urinate right after you have sex to flush out your bladder.    If you use birth control pills and have frequent bladder infections, discuss it with your healthcare provider.  Follow-up care  Call your healthcare provider if all symptoms are not gone after 3 days of treatment. This is especially important if you have repeat infections.  If a culture was done, you will be told if your treatment needs to be changed. If directed, you can call to find out the results.  If X-rays were done, you will be told if the results will affect your treatment.  Call 911  Call 911 if any of the following occur:    Trouble breathing    Hard to wake up or confusion    Fainting (loss of consciousness)    Fast heart rate  When to get medical advice  Call your healthcare provider right away if any of these occur:    Fever of 100.4 F (38.0 C) or higher, or as directed by your healthcare provider    Symptoms are not better after 3 days of treatment    Back or belly pain that gets worse    Repeated vomiting, or unable to keep medicine down    Weakness or dizziness    Vaginal discharge    Pain,  redness, or swelling in the outer vaginal area (labia)  [employer] last reviewed this educational content on 11/1/2019 2000-2021 [employer]. All rights reserved. This information is not intended as a substitute for professional medical care. Always follow your healthcare professional's instructions.           Patient Education     Understanding Urinary Tract Infections (UTIs)   Most UTIs are caused by bacteria, but they may also be caused by viruses or fungi. Bacteria from the bowel are the most common source of infection. The infection may start because of any of the following:     Sexual activity.  During sex, bacteria can travel from the penis, vagina, or rectum into the urethra.     Bacteria outside the rectum getting into the urethra.  Bacteria on the skin outside the rectum may travel into the urethra. This is more common in women since the rectum and urethra are closer to each other than in men. Wiping from front to back after using the toilet and keeping the area clean can help prevent germs from getting to the urethra.    Blocked urine flow through the urinary tract. If urine sits too long, germs may start to grow out of control.  Parts of the urinary tract  The infection can occur in any part of the urinary tract.       The kidneys. These organs collect and store urine.    The ureters. These tubes carry urine from the kidneys to the bladder.    The bladder. This holds urine until you are ready to let it out.    The urethra. This tube carries urine from the bladder out of the body. It is shorter in women, so bacteria can move through it more easily. The urethra is longer in men, so a UTI is less likely to reach the bladder or kidneys in men.  [employer] last reviewed this educational content on 1/1/2020 2000-2021 [employer]. All rights reserved. This information is not intended as a substitute for professional medical care. Always follow your healthcare professional's  instructions.               Return in about 3 months (around 6/10/2022) for Follow up, Routine preventive, in person with Dr. Giles.    Stan Cardozo MD  [facility] DARIN Pulido is a 21 year old who presents for the following health issues     HPI     Genitourinary - Female  Onset/Duration: 2 weeks ago  Description:   Painful urination (Dysuria): YES           Frequency: YES  Blood in urine (Hematuria): YES  Delay in urine (Hesitency): no  Intensity: mild  Progression of Symptoms:  same  Accompanying Signs & Symptoms:  Fever/chills: no  Flank pain: no  Nausea and vomiting: no  Vaginal symptoms: discharge and itching  Abdominal/Pelvic Pain: no  History:   History of frequent UTI s: no  History of kidney stones: no  Sexually Active: YES  Possibility of pregnancy: Yes  Precipitating or alleviating factors: None  Therapies tried and outcome: Cranberry juice prn (contraindicated in Coumadin patients), Increase fluid intake     Patient Active Problem List   Diagnosis     Shoulder pain, right     Exercise-induced asthma     Pes planus     Reactive airway disease     Postpartum care and examination of lactating mother     Current Outpatient Medications   Medication Sig Dispense Refill     albuterol (PROAIR HFA/PROVENTIL HFA/VENTOLIN HFA) 108 (90 Base) MCG/ACT inhaler Inhale 2 puffs into the lungs every 4 hours as needed for shortness of breath / dyspnea or wheezing (Patient not taking: Reported on 3/10/2022)       Health Maintenance Due   Topic Date Due     ANNUAL REVIEW OF  ORDERS  Never done     CHLAMYDIA SCREENING  Never done     ADVANCE CARE PLANNING  Never done     HEPATITIS B IMMUNIZATION (3 of 3 - 3-dose primary series) 05/15/2000     COVID-19 Vaccine (1) Never done     Pneumococcal Vaccine: Pediatrics (0 to 5 Years) and At-Risk Patients (6 to 64 Years) (1 of 2 - PPSV23) Never done     HPV IMMUNIZATION (1 - 2-dose series) Never done     HIV SCREENING  Never done      "HEPATITIS C SCREENING  Never done     ASTHMA CONTROL TEST  02/12/2020     PAP  Never done     ASTHMA ACTION PLAN  08/09/2020     PREVENTIVE CARE VISIT  10/29/2020     INFLUENZA VACCINE (1) 09/01/2021     PHQ-2 (once per calendar year)  01/01/2022         Review of Systems   Constitutional, HEENT, cardiovascular, pulmonary, gi and gu systems are negative, except as otherwise noted.      Objective    /68 (BP Location: Left arm, Patient Position: Sitting, Cuff Size: Adult Regular)   Pulse 80   Temp 97.9  F (36.6  C) (Temporal)   Ht 1.702 m (5' 7\")   Wt 90.4 kg (199 lb 3 oz)   LMP 02/10/2022 (Exact Date)   SpO2 97%   Breastfeeding No   BMI 31.20 kg/m    Body mass index is 31.2 kg/m .  Physical Exam   GENERAL: healthy, alert and no distress  NECK: no adenopathy, no asymmetry, masses, or scars and thyroid normal to palpation  RESP: lungs clear to auscultation - no rales, rhonchi or wheezes  CV: regular rate and rhythm, normal S1 S2, no S3 or S4, no murmur, click or rub, no peripheral edema and peripheral pulses strong  ABDOMEN: tenderness suprapubic, bowel sounds normal and no flank pain  MS: no gross musculoskeletal defects noted, no edema    Results for orders placed or performed in visit on 03/10/22 (from the past 24 hour(s))   UA Macro with Reflex to Micro and Culture - lab collect    Specimen: Urine, Midstream   Result Value Ref Range    Color Urine Straw Colorless, Straw, Light Yellow, Yellow    Appearance Urine Clear Clear    Glucose Urine Negative Negative mg/dL    Bilirubin Urine Negative Negative    Ketones Urine Negative Negative mg/dL    Specific Gravity Urine 1.010 1.003 - 1.035    Blood Urine Negative Negative    pH Urine 6.0 5.0 - 7.0    Protein Albumin Urine Negative Negative mg/dL    Urobilinogen Urine Normal Normal, 2.0 mg/dL    Nitrite Urine Negative Negative    Leukocyte Esterase Urine Small (A) Negative    Bacteria Urine Many (A) None Seen /HPF    Mucus Urine Present (A) None Seen /LPF "    RBC Urine 5 (H) <=2 /HPF    WBC Urine 22 (H) <=5 /HPF    Squamous Epithelials Urine 4 (H) <=1 /HPF    Narrative    Urine Culture ordered based on laboratory criteria   Wet prep - lab collect    Specimen: Vagina; Swab   Result Value Ref Range    Trichomonas Absent Absent    Yeast Absent Absent    Clue Cells Absent Absent    WBCs/high power field 1+ (A) None

## 2022-03-12 LAB — BACTERIA UR CULT: NORMAL

## 2022-07-25 NOTE — PROGRESS NOTES
Doing well overall.  No bleeding. Patient states that four nights ago she experienced what she thinks are contractions. They lasted for about an hour, 15 minutes apart, before resolving on their own. Patient says that she was not doing anything to bring these contractions on. She hasn't experienced any similar symptoms since.   No headaches, dizziness, visual blurriness, nausea, emesis, or other associated symptoms.  Membranes stripped today at patient's request. No bloody show.  Discussed indications, risks, complications and failure rate of inductions.  Induction date set: 8/30/2019 at 0730  Discussed signs of labor and when to call or come in. Discussed stimulating natural labor via physical activity, sexual activity, and nipple stimulation.   Discussed kick counts and fetal movement.  Reportable signs and symptoms discussed.  Follow up for induction in 4 days or sooner with natural labor or any other issues/ concerns.   Leonor Giles MD      OFFICE VISIT  {:[id number]}  ASSESSMENT & PLAN    Samina Mahajan is a 60 year old female who presents for:  ***     Health Maintenance Summary     Pneumococcal Vaccine 0-64 (2 - PCV)  Overdue since 8/8/2019    Shingles Vaccine (3 of 3)  Overdue since 3/31/2020    Influenza Vaccine (1)  Next due on 9/1/2022    Depression Screening (Yearly)  Next due on 7/25/2023    Breast Cancer Screening (Every 2 Years)  Next due on 1/28/2024    DTaP/Tdap/Td Vaccine (2 - Td or Tdap)  Next due on 5/23/2026    Colorectal Cancer Risk - Colonoscopy (Every 5 Years)  Next due on 5/17/2027    Hepatitis C Screening   Completed    Medicare Advantage- Medicare Wellness Visit   Completed    COVID-19 Vaccine   Completed    Hepatitis B Vaccine   Aged Out    Meningococcal Vaccine   Aged Out    HPV Vaccine   Aged Out          Follow-up: No follow-ups on file. Seek additional medical care if worsening or not improving. Red flags discussed.    Instructed patient on correct medication dosing, usage and adverse effects (if applicable).  All questions and concerns discussed. Patient / representative agreeable to plan.    HISTORY    Samina Mahajan is a 60 year old female who presents for:    # Right shoulder pain   DURATION: 5 days   HISTORY: of osteoarthritis  Right shoulder pain radiates to elbow and hand  Has swelling, stiffness and difficulty lifting arm  Denies numbness, tingling or injury  PAIN: 10/10, sharp   WORSE: movements   MEDS tried for this issue: etodolac, tizanidine - not helping  Right handed    ADDITIONAL REVIEW OF SYSTEMS  Denies: Fever, chest pain, shortness of breath, dropping things    Denies pregnancy as history of hysterectomy    PHYSICAL EXAM    Vital Signs:    Visit Vitals  /76   Pulse (!) 56   Temp 98.7 °F (37.1 °C) (Temporal)   Ht 4' 11\" (1.499 m)   Wt 88 kg (194 lb 1.6 oz)   SpO2 98%   BMI 39.20 kg/m²     Constitutional:  No acute distress. Well-developed.  Respiratory:  Lungs are clear to auscultation bilaterally.  Respirations are nonlabored. Breath sounds are equal. No wheezing, rales, or rhonchi.   Cardiovascular:  Regular rate and rhythm. No murmurs. No edema. 2+ radial pulses.  Gastrointestinal:  Soft. Nontender. Nondistended. Normal bowel sounds.  Musculoskeletal:  Normal gait. Moves all 4 extremities spontaneously.  *** Right shoulder: negative Hawkin's, Neer's, Yasmin, lift-off, internal / external rotation  *** Left shoulder: negative Hawkin's, Neer's, Yasmin, lift-off, internal / external rotation  Neurologic:  No focal neurological deficits observed. Normal speech observed. Upper extremities neurovascularly intact.  Psychiatric:  Alert and awake. Normal mood and affect. Responds appropriately to questions and commands.      Current Outpatient Medications   Medication Sig Dispense Refill   • etodolac (LODINE) 400 MG tablet Take 1 tablet by mouth 2 times daily. 60 tablet 0   • tiZANidine (ZANAFLEX) 2 MG tablet Take 1 tablet by mouth every 6 hours as needed for Muscle spasms. 56 tablet 0   • spironolactone (ALDACTONE) 50 MG tablet Take 1 tablet by mouth daily. 90 tablet 1   • trazodone (DESYREL) 150 MG tablet Take 1 tablet by mouth nightly. 90 tablet 1   • sertraline (ZOLOFT) 50 MG tablet Take 1 tablet by mouth daily. 90 tablet 1   • buPROPion (WELLBUTRIN) 100 MG tablet Take 1 tablet by mouth 3 times daily. 270 tablet 1   • fluticasone propionate (Flovent HFA) 44 MCG/ACT inhaler Inhale 2 puffs into the lungs 2 times daily. 1 each 3   • ipratropium (ATROVENT) 0.06 % nasal spray Spray 2 sprays in each nostril 3 times daily. 15 mL 0   • cloNIDine (CATAPRES) 0.1 MG tablet Take 1 tablet by mouth 2 times daily as needed (Hot flashes). 180 tablet 0   • albuterol 108 (90 Base) MCG/ACT inhaler Inhale 2 puffs into the lungs every 4 hours as needed for Shortness of Breath or Wheezing. 3 each 1   • diclofenac (VOLTAREN) 1 % gel Apply 2 g topically 4 times daily as needed for Pain (to thumb joints). 300 g 3   • cholecalciferol (VITAMIN  D3) 1000 UNITS tablet Take 1,000 Units by mouth daily.     • SUMAtriptan (IMITREX) 25 MG tablet Take 1 tablet by mouth at onset of migraine. May repeat after 2 hours if needed. Max dose: 200mg daily. 20 tablet 1   • ascorbic acid (VITAMIN C) 500 MG tablet Take 500 mg by mouth daily. Take 500 mg by mouth daily     • vitamin E 200 UNITS capsule Take 200 Units by mouth daily. Take 200 Units by mouth daily       No current facility-administered medications for this visit.     ALLERGIES:   Allergen Reactions   • Amlodipine Other (See Comments)     Lower extremity edema   • Lisinopril Other (See Comments)     cough   • Oxycontin Other (See Comments)     vomiting   • Ranitidine Other (See Comments)     vomiting     Past Medical History:   Diagnosis Date   • Allergy    • Anemia    • Anxiety    • Arthritis    • Asthma    • Bulging of cervical intervertebral disc    • Depressive disorder    • Essential (primary) hypertension    • Fibromyalgia    • GERD (gastroesophageal reflux disease)    • Osteoporosis    • RAD (reactive airway disease)    • Sleep apnea     uses CPAP   • Substance abuse (CMS/HCC)      Past Surgical History:   Procedure Laterality Date   • Abdomen surgery     • Appendectomy     • Bunionectomy  15    bunionectomy with 1st metatarsal osteotomy foot, excision neuroma 3rd intermetatarsal space foot   • Carpal tunnel release     •  section, classic  , 1985,    •  section, low transverse     • Cholecystectomy     • Colonoscopy  15    Diverticulosis/hem/polyps-hyperplastic   • Colonoscopy  2022   • Cyst removal     • Eye surgery     • Hand surgery      cyst removed- palm   • Hysterectomy  2000    still has ovaries   • Removal gallbladder     • Tubal ligation       Social History     Socioeconomic History   • Marital status: Single     Spouse name: Not on file   • Number of children: Not on file   • Years of education: Not on file   • Highest education level:  Not on file   Occupational History   • Not on file   Tobacco Use   • Smoking status: Former Smoker     Packs/day: 1.50     Years: 12.00     Pack years: 18.00     Types: Cigarettes     Quit date: 2011     Years since quittin.5   • Smokeless tobacco: Never Used   Vaping Use   • Vaping Use: never used   Substance and Sexual Activity   • Alcohol use: No     Alcohol/week: 0.0 standard drinks   • Drug use: No   • Sexual activity: Never   Other Topics Concern   • Not on file   Social History Narrative   • Not on file     Social Determinants of Health     Financial Resource Strain: Not on file   Food Insecurity: Not on file   Transportation Needs: Not on file   Physical Activity: Not on file   Stress: Not on file   Social Connections: Not on file   Intimate Partner Violence: Not At Risk   • Social Determinants: Intimate Partner Violence Past Fear: No   • Social Determinants: Intimate Partner Violence Current Fear: No     Family History   Problem Relation Age of Onset   • Cancer Mother 40        NHL   • Hypertension Mother    • Hypertension Father            • Stroke/TIA Father    • Other Father         emphysema   • Cancer Father         lung; smoker   • High blood pressure Father    • Hypertension Brother    • Cancer Brother         Lung cancer     Family Status   Relation Name Status   • Mo     • Fa     • Bro         {:[id number]}    Ronald Garcia MD, MA  Family Medicine with Obstetrics  [address] [address] [place], [place] [id number]  Telephone: [phone]           Fax: [phone]